# Patient Record
Sex: FEMALE
[De-identification: names, ages, dates, MRNs, and addresses within clinical notes are randomized per-mention and may not be internally consistent; named-entity substitution may affect disease eponyms.]

---

## 2017-04-24 ENCOUNTER — APPOINTMENT (OUTPATIENT)
Dept: CARDIOLOGY | Facility: CLINIC | Age: 72
End: 2017-04-24

## 2017-05-04 ENCOUNTER — APPOINTMENT (OUTPATIENT)
Dept: ORTHOPEDIC SURGERY | Facility: CLINIC | Age: 72
End: 2017-05-04

## 2017-05-04 DIAGNOSIS — M54.32 SCIATICA, LEFT SIDE: ICD-10-CM

## 2017-05-04 DIAGNOSIS — M54.5 LOW BACK PAIN: ICD-10-CM

## 2017-05-08 ENCOUNTER — APPOINTMENT (OUTPATIENT)
Dept: PHYSICAL MEDICINE AND REHAB | Facility: CLINIC | Age: 72
End: 2017-05-08

## 2017-05-08 DIAGNOSIS — M54.16 RADICULOPATHY, LUMBAR REGION: ICD-10-CM

## 2017-05-09 VITALS — RESPIRATION RATE: 16 BRPM | HEART RATE: 70 BPM | HEIGHT: 63 IN | WEIGHT: 143 LBS | BODY MASS INDEX: 25.34 KG/M2

## 2017-05-11 ENCOUNTER — APPOINTMENT (OUTPATIENT)
Dept: CARDIOLOGY | Facility: CLINIC | Age: 72
End: 2017-05-11

## 2017-05-11 VITALS — DIASTOLIC BLOOD PRESSURE: 81 MMHG | OXYGEN SATURATION: 95 % | HEART RATE: 69 BPM | SYSTOLIC BLOOD PRESSURE: 138 MMHG

## 2017-05-18 ENCOUNTER — OUTPATIENT (OUTPATIENT)
Dept: OUTPATIENT SERVICES | Facility: HOSPITAL | Age: 72
LOS: 1 days | End: 2017-05-18
Payer: MEDICARE

## 2017-05-18 DIAGNOSIS — M54.16 RADICULOPATHY, LUMBAR REGION: ICD-10-CM

## 2017-05-28 ENCOUNTER — NON-APPOINTMENT (OUTPATIENT)
Age: 72
End: 2017-05-28

## 2017-07-19 PROCEDURE — 97163 PT EVAL HIGH COMPLEX 45 MIN: CPT

## 2017-07-19 PROCEDURE — 97110 THERAPEUTIC EXERCISES: CPT

## 2017-07-19 PROCEDURE — G8979: CPT | Mod: CH

## 2017-07-19 PROCEDURE — 97010 HOT OR COLD PACKS THERAPY: CPT

## 2017-07-19 PROCEDURE — G8978: CPT | Mod: CJ

## 2017-07-19 PROCEDURE — 97140 MANUAL THERAPY 1/> REGIONS: CPT

## 2017-08-08 ENCOUNTER — APPOINTMENT (OUTPATIENT)
Dept: OBGYN | Facility: CLINIC | Age: 72
End: 2017-08-08
Payer: MEDICARE

## 2017-08-08 VITALS
HEIGHT: 63 IN | BODY MASS INDEX: 25.69 KG/M2 | DIASTOLIC BLOOD PRESSURE: 83 MMHG | HEART RATE: 71 BPM | SYSTOLIC BLOOD PRESSURE: 131 MMHG | WEIGHT: 145 LBS

## 2017-08-08 DIAGNOSIS — N39.0 URINARY TRACT INFECTION, SITE NOT SPECIFIED: ICD-10-CM

## 2017-08-08 LAB
BILIRUB UR QL STRIP: NORMAL
CLARITY UR: NORMAL
COLLECTION METHOD: NORMAL
GLUCOSE UR-MCNC: NORMAL
HCG UR QL: 0.2 EU/DL
HGB UR QL STRIP.AUTO: NORMAL
KETONES UR-MCNC: NORMAL
LEUKOCYTE ESTERASE UR QL STRIP: NORMAL
NITRITE UR QL STRIP: NORMAL
PH UR STRIP: 5
PROT UR STRIP-MCNC: NORMAL
SP GR UR STRIP: 1.02

## 2017-08-08 PROCEDURE — 99213 OFFICE O/P EST LOW 20 MIN: CPT

## 2017-08-08 PROCEDURE — 81003 URINALYSIS AUTO W/O SCOPE: CPT | Mod: QW

## 2017-08-11 LAB — BACTERIA UR CULT: ABNORMAL

## 2017-10-10 ENCOUNTER — APPOINTMENT (OUTPATIENT)
Dept: OBGYN | Facility: CLINIC | Age: 72
End: 2017-10-10
Payer: MEDICARE

## 2017-10-10 VITALS
WEIGHT: 145 LBS | SYSTOLIC BLOOD PRESSURE: 135 MMHG | HEART RATE: 69 BPM | DIASTOLIC BLOOD PRESSURE: 74 MMHG | HEIGHT: 63 IN | BODY MASS INDEX: 25.69 KG/M2

## 2017-10-10 DIAGNOSIS — M85.80 OTHER SPECIFIED DISORDERS OF BONE DENSITY AND STRUCTURE, UNSPECIFIED SITE: ICD-10-CM

## 2017-10-10 PROCEDURE — 99213 OFFICE O/P EST LOW 20 MIN: CPT

## 2017-10-10 PROCEDURE — 82270 OCCULT BLOOD FECES: CPT

## 2017-11-13 ENCOUNTER — OTHER (OUTPATIENT)
Age: 72
End: 2017-11-13

## 2018-03-30 ENCOUNTER — APPOINTMENT (OUTPATIENT)
Dept: PHYSICAL MEDICINE AND REHAB | Facility: CLINIC | Age: 73
End: 2018-03-30

## 2018-05-14 ENCOUNTER — APPOINTMENT (OUTPATIENT)
Dept: CARDIOLOGY | Facility: CLINIC | Age: 73
End: 2018-05-14
Payer: MEDICARE

## 2018-05-14 ENCOUNTER — NON-APPOINTMENT (OUTPATIENT)
Age: 73
End: 2018-05-14

## 2018-05-14 VITALS
DIASTOLIC BLOOD PRESSURE: 79 MMHG | HEART RATE: 67 BPM | WEIGHT: 149 LBS | BODY MASS INDEX: 26.39 KG/M2 | OXYGEN SATURATION: 98 % | SYSTOLIC BLOOD PRESSURE: 144 MMHG

## 2018-05-14 DIAGNOSIS — R06.09 OTHER FORMS OF DYSPNEA: ICD-10-CM

## 2018-05-14 PROCEDURE — 99214 OFFICE O/P EST MOD 30 MIN: CPT

## 2018-05-14 PROCEDURE — 93000 ELECTROCARDIOGRAM COMPLETE: CPT

## 2018-05-14 RX ORDER — IBUPROFEN 600 MG/1
600 TABLET, FILM COATED ORAL TWICE DAILY
Qty: 60 | Refills: 0 | Status: DISCONTINUED | COMMUNITY
Start: 2017-05-04 | End: 2018-05-14

## 2018-05-14 RX ORDER — METHYLPREDNISOLONE 4 MG/1
4 TABLET ORAL
Qty: 21 | Refills: 2 | Status: DISCONTINUED | COMMUNITY
Start: 2017-05-04 | End: 2018-05-14

## 2018-05-14 RX ORDER — NITROFURANTOIN MACROCRYSTALS 100 MG/1
100 CAPSULE ORAL
Qty: 10 | Refills: 0 | Status: DISCONTINUED | COMMUNITY
Start: 2017-08-08 | End: 2018-05-14

## 2018-06-25 ENCOUNTER — APPOINTMENT (OUTPATIENT)
Dept: OBGYN | Facility: CLINIC | Age: 73
End: 2018-06-25
Payer: MEDICARE

## 2018-06-25 VITALS
WEIGHT: 145 LBS | SYSTOLIC BLOOD PRESSURE: 140 MMHG | BODY MASS INDEX: 25.69 KG/M2 | HEIGHT: 63 IN | HEART RATE: 81 BPM | DIASTOLIC BLOOD PRESSURE: 77 MMHG

## 2018-06-25 LAB
BILIRUB UR QL STRIP: NEGATIVE
GLUCOSE UR-MCNC: NEGATIVE
HCG UR QL: 0.2 EU/DL
HGB UR QL STRIP.AUTO: NORMAL
KETONES UR-MCNC: NEGATIVE
LEUKOCYTE ESTERASE UR QL STRIP: NORMAL
NITRITE UR QL STRIP: NEGATIVE
PH UR STRIP: 5.5
PROT UR STRIP-MCNC: NEGATIVE
SP GR UR STRIP: <=1.005

## 2018-06-25 PROCEDURE — 81003 URINALYSIS AUTO W/O SCOPE: CPT | Mod: QW

## 2018-06-25 PROCEDURE — 99213 OFFICE O/P EST LOW 20 MIN: CPT

## 2018-06-29 LAB — BACTERIA UR CULT: ABNORMAL

## 2018-11-26 ENCOUNTER — APPOINTMENT (OUTPATIENT)
Dept: OBGYN | Facility: CLINIC | Age: 73
End: 2018-11-26
Payer: MEDICARE

## 2018-11-26 VITALS
SYSTOLIC BLOOD PRESSURE: 144 MMHG | WEIGHT: 146 LBS | HEIGHT: 63 IN | DIASTOLIC BLOOD PRESSURE: 80 MMHG | BODY MASS INDEX: 25.87 KG/M2

## 2018-11-26 DIAGNOSIS — Z00.00 ENCOUNTER FOR GENERAL ADULT MEDICAL EXAMINATION W/OUT ABNORMAL FINDINGS: ICD-10-CM

## 2018-11-26 PROCEDURE — 82270 OCCULT BLOOD FECES: CPT

## 2018-11-26 PROCEDURE — G0101: CPT

## 2018-11-30 LAB — CYTOLOGY CVX/VAG DOC THIN PREP: NORMAL

## 2019-01-30 ENCOUNTER — APPOINTMENT (OUTPATIENT)
Dept: VASCULAR SURGERY | Facility: CLINIC | Age: 74
End: 2019-01-30
Payer: MEDICARE

## 2019-01-30 VITALS
HEART RATE: 72 BPM | SYSTOLIC BLOOD PRESSURE: 119 MMHG | BODY MASS INDEX: 25.84 KG/M2 | HEIGHT: 62.5 IN | TEMPERATURE: 98.6 F | WEIGHT: 144 LBS | DIASTOLIC BLOOD PRESSURE: 71 MMHG | RESPIRATION RATE: 16 BRPM | OXYGEN SATURATION: 97 %

## 2019-01-30 PROCEDURE — 99203 OFFICE O/P NEW LOW 30 MIN: CPT

## 2019-01-30 RX ORDER — SULFAMETHOXAZOLE AND TRIMETHOPRIM 800; 160 MG/1; MG/1
800-160 TABLET ORAL TWICE DAILY
Qty: 14 | Refills: 1 | Status: DISCONTINUED | COMMUNITY
Start: 2018-06-25 | End: 2019-01-30

## 2019-02-27 ENCOUNTER — APPOINTMENT (OUTPATIENT)
Dept: ORTHOPEDIC SURGERY | Facility: CLINIC | Age: 74
End: 2019-02-27

## 2019-03-18 NOTE — PHYSICAL EXAM
[Normal Breath Sounds] : Normal breath sounds [Normal Heart Sounds] : normal heart sounds [2+] : left 2+ [No Rash or Lesion] : No rash or lesion [Alert] : alert [Oriented to Person] : oriented to person [Oriented to Place] : oriented to place [Oriented to Time] : oriented to time [Calm] : calm [Ankle Swelling (On Exam)] : not present [JVD] : no jugular venous distention  [Varicose Veins Of Lower Extremities] : not present [] : not present [Abdomen Masses] : No abdominal masses [Tender] : was nontender [Enlarged] : not enlarged [de-identified] : AOX3 in NAD  [de-identified] : Normocephalic, atraumatic  [de-identified] : OPAL not indicated  [de-identified] : Soft, NT, ND

## 2019-03-18 NOTE — HISTORY OF PRESENT ILLNESS
[FreeTextEntry1] : 73 year old post menopausal female seen in vascular office today after having been referred by Dr. Lombardi given imaging findings of a calcified 1cm splenic artery aneurysm Dec 2018. Patient states that she was getting a routine physical exam when a CXR was ordered at the time which prompted the subsequent CT scan (unable to articulate why she needed the CXR in the first place) States that she is in her usual state of health and has not noticed any recent health changes. ROS negative for resting leg pain, fever, chills, nausea, vomiting, chest pain, SOB, dyspnea, abd pain, dysuria or changes in bowel function.

## 2019-03-18 NOTE — ASSESSMENT
[FreeTextEntry1] : 73 year old female with asymptomatic splenic artery aneurysm seen on imaging. No acute surgical intervention at this time. Recommend return to vascular office in 6 months for ultrasound to assess size and if unchanged, return for follow up PRN. \par \par -Continue meds\par -Activity as tolerated\par -RTC in 6 months [Arterial/Venous Disease] : arterial/venous disease

## 2019-05-16 ENCOUNTER — APPOINTMENT (OUTPATIENT)
Dept: CARDIOLOGY | Facility: CLINIC | Age: 74
End: 2019-05-16
Payer: MEDICARE

## 2019-05-16 ENCOUNTER — NON-APPOINTMENT (OUTPATIENT)
Age: 74
End: 2019-05-16

## 2019-05-16 VITALS
DIASTOLIC BLOOD PRESSURE: 79 MMHG | RESPIRATION RATE: 16 BRPM | HEART RATE: 74 BPM | BODY MASS INDEX: 26.1 KG/M2 | SYSTOLIC BLOOD PRESSURE: 137 MMHG | OXYGEN SATURATION: 96 % | WEIGHT: 145 LBS

## 2019-05-16 DIAGNOSIS — R09.89 OTHER SPECIFIED SYMPTOMS AND SIGNS INVOLVING THE CIRCULATORY AND RESPIRATORY SYSTEMS: ICD-10-CM

## 2019-05-16 PROCEDURE — 93306 TTE W/DOPPLER COMPLETE: CPT

## 2019-05-16 PROCEDURE — 93880 EXTRACRANIAL BILAT STUDY: CPT

## 2019-05-16 PROCEDURE — 93000 ELECTROCARDIOGRAM COMPLETE: CPT

## 2019-05-16 PROCEDURE — 99214 OFFICE O/P EST MOD 30 MIN: CPT

## 2019-05-16 RX ORDER — LOSARTAN POTASSIUM 50 MG/1
50 TABLET, FILM COATED ORAL DAILY
Qty: 90 | Refills: 0 | Status: DISCONTINUED | COMMUNITY
Start: 2019-05-02 | End: 2019-05-16

## 2019-05-16 RX ORDER — OMEPRAZOLE 20 MG/1
20 CAPSULE, DELAYED RELEASE ORAL
Qty: 30 | Refills: 0 | Status: DISCONTINUED | COMMUNITY
Start: 2017-05-01 | End: 2019-05-16

## 2019-05-28 ENCOUNTER — APPOINTMENT (OUTPATIENT)
Dept: ORTHOPEDIC SURGERY | Facility: CLINIC | Age: 74
End: 2019-05-28
Payer: MEDICARE

## 2019-05-28 VITALS
HEART RATE: 65 BPM | HEIGHT: 63 IN | BODY MASS INDEX: 25.69 KG/M2 | WEIGHT: 145 LBS | SYSTOLIC BLOOD PRESSURE: 143 MMHG | DIASTOLIC BLOOD PRESSURE: 76 MMHG

## 2019-05-28 DIAGNOSIS — M47.816 SPONDYLOSIS W/OUT MYELOPATHY OR RADICULOPATHY, LUMBAR REGION: ICD-10-CM

## 2019-05-28 DIAGNOSIS — M41.50 OTHER SECONDARY SCOLIOSIS, SITE UNSPECIFIED: ICD-10-CM

## 2019-05-28 PROCEDURE — 96372 THER/PROPH/DIAG INJ SC/IM: CPT

## 2019-05-28 PROCEDURE — 99214 OFFICE O/P EST MOD 30 MIN: CPT | Mod: 25

## 2019-05-28 PROCEDURE — 72100 X-RAY EXAM L-S SPINE 2/3 VWS: CPT

## 2019-05-28 NOTE — REVIEW OF SYSTEMS
[Joint Pain] : joint pain [Negative] : Heme/Lymph [Chills] : no chills [Fever] : no fever [Cough] : no cough [SOB on Exertion] : no shortness of breath on exertion

## 2019-05-28 NOTE — PHYSICAL EXAM
[de-identified] : Xray of the lumbar spine taken today shows lumbar spondylosis as well as degenerative rotoscoliosis, demonstrates retrolisthesis about L3 in relation to L4, progression of rotoscoliosis when compared to prior xrays from 2017\par \par  [de-identified] : CONSTITUTIONAL: The patient is a very pleasant individual who is well-nourished and who appears stated age.\par PSYCHIATRIC: The patient is alert and oriented X 3 and in no apparent distress, and participates with orthopedic evaluation well.\par HEAD: Atraumatic and is nonsyndromic in appearance.\par EENT: No visible thyromegaly, EOMI.\par RESPIRATORY: Respiratory rate is regular, not dyspneic on examination.\par LYMPHATICS: There is no inguinal lymphadenopathy\par INTEGUMENTARY: Skin is clean, dry, and intact about the bilateral lower extremities and lumbar spine.\par VASCULAR: There is brisk capillary refill about the bilateral lower extremities.\par NEUROLOGIC: There are no pathologic reflexes. There is no decrease in sensation of the bilateral lower extremities on Wartenberg pinwheel examination. Deep tendon reflexes are well maintained at 2+/4 of the bilateral lower extremities and are symmetric..\par MUSCULOSKELETAL: There is no visible muscular atrophy. Manual motor strength is well maintained in the bilateral lower extremities. Range of motion of lumbar spine is well maintained. The patient ambulates in a non- myelopathic manner. Mildly positive tension sign and straight leg raise on the left Quad extension, ankle dorsiflexion, EHL, plantar flexion, and ankle eversion are well preserved. Normal secondary orthopaedic exam of bilateral hips, greater trochanteric area, knees and ankles. \par \par Mechanically oriented low back pain.

## 2019-05-28 NOTE — HISTORY OF PRESENT ILLNESS
[de-identified] : 73 year old female presents for lumbar spine pain, last seen 2 years ago. Her pain is L>R. She has done PT which improved her pain, but has since stopped. 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 She has no recent conservative treatment. Patient denies taking any medications for pain. Her pain radiates to left lateral hip and down her lateral thigh.  [Ataxia] : no ataxia [Incontinence] : no incontinence [Loss of Dexterity] : good dexterity [Urinary Ret.] : no urinary retention

## 2019-05-28 NOTE — ADDENDUM
[FreeTextEntry1] : Documented by Marcelina Bar acting as a scribe for PERRI Price. on 05/28/2019. All medical record entries made by the Scribe were at my, PERRI Price, direction and personally dictated by me on 05/28/2019. I have reviewed the chart and agree that the record accurately reflects my personal performance of the history, physical exam, assessment and plan. I have also personally directed, reviewed, and agreed with the chart.

## 2019-05-28 NOTE — DISCUSSION/SUMMARY
[de-identified] : She is going to restart PT for 1-2x per week for 4 weeks. She may continue with home exercise, or her pain will return. We will consider MRI if she continues to be symptomatic despite home exercise and PT. We have provided a prescription for Naprosyn 500 BID or prn for pain. She was given a therapeutic injection today for symptomatic relief. She may follow up prn.

## 2019-05-28 NOTE — PROCEDURE
[de-identified] : Verbal consent was obtained and all questions, comments and concerns were addressed. Right buttock was cleansed with alcohol prep X 3, ethyl chloride was used as local anesthetic. Under sterile precautions 30mg of Ketorolac and 80mg Depo-Medrol were instilled in to the right buttock under sterile precautions. Patient tolerated procedure well. Dry sterile dressing was placed and patient described relief of pain 5 minutes after procedure was performed.

## 2019-05-31 RX ORDER — MULTIVIT-MIN/FOLIC/VIT K/LYCOP 400-300MCG
10 MCG TABLET ORAL
Qty: 60 | Refills: 0 | Status: ACTIVE | COMMUNITY
Start: 2018-12-18

## 2019-06-21 ENCOUNTER — OTHER (OUTPATIENT)
Age: 74
End: 2019-06-21

## 2019-06-25 ENCOUNTER — APPOINTMENT (OUTPATIENT)
Dept: ORTHOPEDIC SURGERY | Facility: CLINIC | Age: 74
End: 2019-06-25

## 2019-07-16 ENCOUNTER — APPOINTMENT (OUTPATIENT)
Dept: VASCULAR SURGERY | Facility: CLINIC | Age: 74
End: 2019-07-16

## 2019-07-23 ENCOUNTER — APPOINTMENT (OUTPATIENT)
Dept: VASCULAR SURGERY | Facility: CLINIC | Age: 74
End: 2019-07-23
Payer: MEDICARE

## 2019-07-23 VITALS
DIASTOLIC BLOOD PRESSURE: 77 MMHG | HEART RATE: 66 BPM | BODY MASS INDEX: 26.05 KG/M2 | TEMPERATURE: 98 F | SYSTOLIC BLOOD PRESSURE: 161 MMHG | WEIGHT: 147 LBS | HEIGHT: 63 IN | OXYGEN SATURATION: 95 %

## 2019-07-23 PROCEDURE — 99213 OFFICE O/P EST LOW 20 MIN: CPT

## 2019-07-23 PROCEDURE — 93978 VASCULAR STUDY: CPT

## 2020-12-15 PROBLEM — N39.0 ACUTE LOWER UTI: Status: RESOLVED | Noted: 2017-08-08 | Resolved: 2020-12-15

## 2021-01-16 ENCOUNTER — NON-APPOINTMENT (OUTPATIENT)
Age: 76
End: 2021-01-16

## 2021-05-04 ENCOUNTER — APPOINTMENT (OUTPATIENT)
Dept: GASTROENTEROLOGY | Facility: CLINIC | Age: 76
End: 2021-05-04
Payer: MEDICARE

## 2021-05-04 VITALS
HEART RATE: 73 BPM | HEIGHT: 63 IN | DIASTOLIC BLOOD PRESSURE: 90 MMHG | RESPIRATION RATE: 14 BRPM | SYSTOLIC BLOOD PRESSURE: 154 MMHG | WEIGHT: 140 LBS | BODY MASS INDEX: 24.8 KG/M2 | OXYGEN SATURATION: 98 % | TEMPERATURE: 98 F

## 2021-05-04 PROCEDURE — 82272 OCCULT BLD FECES 1-3 TESTS: CPT

## 2021-05-04 PROCEDURE — 99204 OFFICE O/P NEW MOD 45 MIN: CPT

## 2021-05-04 NOTE — HISTORY OF PRESENT ILLNESS
[de-identified] : Patient over the last 6 months is seen a significant change in bowel habits.  Her stools are scybalous in nature and she has incomplete evacuation and sometimes more frequent bowel movements.  She has no rectal bleeding abdominal pain or weight loss or loss of appetite.  Patient does not remember when her last colonoscopy was but had to be at least 10 years ago.  She said 1 colonoscopy could not be completed and she went for an x-ray afterwards which sounds like a virtual colonoscopy but she was not clear.  She also thought she might of had 1 done after that.  The patient does have a history of IBS years ago.

## 2021-05-04 NOTE — ASSESSMENT
[FreeTextEntry1] : I discussed with the patient that given her history of recent onset of altered bowel habits and its been many years since her last colonoscopy it would be appropriate to do a colonoscopy to evaluate the etiology of these symptoms.  This could just be her IBS or possibly could represent a more significant problem such as a colon neoplasm.  However, given her prior history of difficulty with colonoscopies I told the patient we would attempt to get the records from [facility] to see what the issue was as well as to see if we could find out if she did have a virtual CT or other evaluation of her colon by the radiologist.  She will also get routine blood work performed.  Pending review of this prior history we will determine what our next step is and most likely she will need to have a colonoscopy

## 2021-05-14 ENCOUNTER — NON-APPOINTMENT (OUTPATIENT)
Age: 76
End: 2021-05-14

## 2021-05-14 LAB
ALBUMIN SERPL ELPH-MCNC: 4.7 G/DL
ALP BLD-CCNC: 81 U/L
ALT SERPL-CCNC: 14 U/L
ANION GAP SERPL CALC-SCNC: 13 MMOL/L
AST SERPL-CCNC: 39 U/L
BASOPHILS # BLD AUTO: 0.03 K/UL
BASOPHILS NFR BLD AUTO: 0.6 %
BILIRUB SERPL-MCNC: 0.6 MG/DL
BUN SERPL-MCNC: 19 MG/DL
CALCIUM SERPL-MCNC: 9.5 MG/DL
CHLORIDE SERPL-SCNC: 106 MMOL/L
CO2 SERPL-SCNC: 24 MMOL/L
CREAT SERPL-MCNC: 0.61 MG/DL
EOSINOPHIL # BLD AUTO: 0.13 K/UL
EOSINOPHIL NFR BLD AUTO: 2.7 %
GLUCOSE SERPL-MCNC: 92 MG/DL
HCT VFR BLD CALC: 44.5 %
HGB BLD-MCNC: 14.3 G/DL
IMM GRANULOCYTES NFR BLD AUTO: 0.2 %
LYMPHOCYTES # BLD AUTO: 1.83 K/UL
LYMPHOCYTES NFR BLD AUTO: 38 %
MAN DIFF?: NORMAL
MCHC RBC-ENTMCNC: 30.6 PG
MCHC RBC-ENTMCNC: 32.1 GM/DL
MCV RBC AUTO: 95.3 FL
MONOCYTES # BLD AUTO: 0.48 K/UL
MONOCYTES NFR BLD AUTO: 10 %
NEUTROPHILS # BLD AUTO: 2.34 K/UL
NEUTROPHILS NFR BLD AUTO: 48.5 %
PLATELET # BLD AUTO: 267 K/UL
POTASSIUM SERPL-SCNC: 4.4 MMOL/L
PROT SERPL-MCNC: 6.8 G/DL
RBC # BLD: 4.67 M/UL
RBC # FLD: 13.7 %
SODIUM SERPL-SCNC: 142 MMOL/L
WBC # FLD AUTO: 4.82 K/UL

## 2021-06-18 ENCOUNTER — NON-APPOINTMENT (OUTPATIENT)
Age: 76
End: 2021-06-18

## 2021-06-18 ENCOUNTER — APPOINTMENT (OUTPATIENT)
Dept: CARDIOLOGY | Facility: CLINIC | Age: 76
End: 2021-06-18
Payer: MEDICARE

## 2021-06-18 VITALS
HEART RATE: 70 BPM | SYSTOLIC BLOOD PRESSURE: 140 MMHG | TEMPERATURE: 99.1 F | DIASTOLIC BLOOD PRESSURE: 66 MMHG | OXYGEN SATURATION: 95 % | WEIGHT: 140 LBS | HEIGHT: 60 IN | BODY MASS INDEX: 27.48 KG/M2

## 2021-06-18 VITALS — SYSTOLIC BLOOD PRESSURE: 126 MMHG | DIASTOLIC BLOOD PRESSURE: 66 MMHG

## 2021-06-18 DIAGNOSIS — I72.8 ANEURYSM OF OTHER SPECIFIED ARTERIES: ICD-10-CM

## 2021-06-18 PROCEDURE — 99213 OFFICE O/P EST LOW 20 MIN: CPT

## 2021-06-18 PROCEDURE — 93000 ELECTROCARDIOGRAM COMPLETE: CPT

## 2021-06-18 RX ORDER — NAPROXEN 500 MG/1
500 TABLET ORAL
Qty: 60 | Refills: 0 | Status: DISCONTINUED | COMMUNITY
Start: 2019-05-30 | End: 2021-06-18

## 2021-06-18 NOTE — REVIEW OF SYSTEMS
[Feeling Fatigued] : feeling fatigued [Dyspnea on exertion] : dyspnea during exertion [Negative] : Heme/Lymph [Fever] : no fever [Chills] : no chills [Leg Claudication] : no intermittent leg claudication [Palpitations] : no palpitations

## 2021-06-18 NOTE — PHYSICAL EXAM
[Well Developed] : well developed [Well Nourished] : well nourished [No Acute Distress] : no acute distress [Normal Venous Pressure] : normal venous pressure [Normal S1, S2] : normal S1, S2 [No Murmur] : no murmur [No Rub] : no rub [No Gallop] : no gallop [Clear Lung Fields] : clear lung fields [Good Air Entry] : good air entry [No Respiratory Distress] : no respiratory distress  [Soft] : abdomen soft [Non Tender] : non-tender [No Masses/organomegaly] : no masses/organomegaly [Normal Bowel Sounds] : normal bowel sounds [Normal Gait] : normal gait [No Edema] : no edema [No Cyanosis] : no cyanosis [No Clubbing] : no clubbing [No Varicosities] : no varicosities [No Rash] : no rash [No Skin Lesions] : no skin lesions [Moves all extremities] : moves all extremities [No Focal Deficits] : no focal deficits [Normal Speech] : normal speech [Alert and Oriented] : alert and oriented [Normal memory] : normal memory [de-identified] : Right carotid bruit

## 2021-06-18 NOTE — ASSESSMENT
[FreeTextEntry1] : Pt was not taken her BP meds\par BP to goal at this time\par Low salt diet, ont with losartan\par Labs from pmd requested.\par No significant murmur on exam\par Patient was advised to partake in 150 minutes of moderate exercise per week.\par Last stress test done in 2016. \par TTE and carotid results dw pt as well\par Patient was advised to see us in 1 year if no new symptoms or earlier with any change in symptoms.\par Pt with splenic artery aneurysm, followed by Dr Mcgee. \par

## 2021-06-18 NOTE — HISTORY OF PRESENT ILLNESS
[FreeTextEntry1] : Very pleasant 75-year-old female who is here with hypertension hyperlipidemia and valvular heart disease.  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  She also tries to walk.  She denies having any chest pain.  Her breathing is nonlabored.  She denies any palpitations syncope or presyncope.  She has no side effects with simvastatin.  She has seen\par \par Primary care physician recently had blood work done\par \par EKG today with normal sinus rhythm normal axis and intervals and no new ST-T changes.  Leftward axis is present.\par \par \par

## 2021-07-08 ENCOUNTER — RX RENEWAL (OUTPATIENT)
Age: 76
End: 2021-07-08

## 2021-07-30 ENCOUNTER — APPOINTMENT (OUTPATIENT)
Dept: ORTHOPEDIC SURGERY | Facility: CLINIC | Age: 76
End: 2021-07-30

## 2021-08-27 ENCOUNTER — APPOINTMENT (OUTPATIENT)
Dept: ORTHOPEDIC SURGERY | Facility: CLINIC | Age: 76
End: 2021-08-27
Payer: MEDICARE

## 2021-08-27 VITALS
HEIGHT: 64 IN | DIASTOLIC BLOOD PRESSURE: 79 MMHG | WEIGHT: 133.8 LBS | BODY MASS INDEX: 22.84 KG/M2 | HEART RATE: 63 BPM | SYSTOLIC BLOOD PRESSURE: 132 MMHG

## 2021-08-27 PROCEDURE — 99214 OFFICE O/P EST MOD 30 MIN: CPT | Mod: 25

## 2021-08-27 PROCEDURE — 20610 DRAIN/INJ JOINT/BURSA W/O US: CPT | Mod: 50

## 2021-08-27 PROCEDURE — 73562 X-RAY EXAM OF KNEE 3: CPT | Mod: 50

## 2021-08-27 RX ORDER — OXICONAZOLE NITRATE 10 MG/G
1 CREAM TOPICAL
Qty: 60 | Refills: 0 | Status: ACTIVE | COMMUNITY
Start: 2021-05-11

## 2022-01-06 ENCOUNTER — APPOINTMENT (OUTPATIENT)
Dept: GASTROENTEROLOGY | Facility: CLINIC | Age: 77
End: 2022-01-06
Payer: MEDICARE

## 2022-01-06 VITALS
HEART RATE: 60 BPM | TEMPERATURE: 98 F | HEIGHT: 64 IN | RESPIRATION RATE: 16 BRPM | OXYGEN SATURATION: 97 % | WEIGHT: 133 LBS | SYSTOLIC BLOOD PRESSURE: 110 MMHG | DIASTOLIC BLOOD PRESSURE: 70 MMHG | BODY MASS INDEX: 22.71 KG/M2

## 2022-01-06 DIAGNOSIS — R19.4 CHANGE IN BOWEL HABIT: ICD-10-CM

## 2022-01-06 PROCEDURE — 99214 OFFICE O/P EST MOD 30 MIN: CPT

## 2022-01-06 NOTE — ASSESSMENT
[FreeTextEntry1] : I discussed with the patient that it be worthwhile to do a colonoscopy at least attempted even if it cannot be performed 10 years ago.  She has had a change in bowel habits and a positive Cologuard test and needs to be investigated further.  If it becomes technically impossible to complete the colonoscopy then we will send her for a virtual anoscopy.  She will get repeat blood work including CBC and CMP and Clenpiq was ordered.  The indications benefits risks alternatives to colonoscopy discussed with the patient and she is medically optimal for the planned procedure

## 2022-01-06 NOTE — HISTORY OF PRESENT ILLNESS
[de-identified] : Patient with a history of change in bowel habits and constipation and a positive Cologuard test.  She was seen in May and was post to get results of her prior colonoscopy at [facility] over 10 years ago but that was never accomplished.  There was a question of whether or not there was an incomplete colonoscopy and possible virtual colonoscopy done at that time.  Patient now presents for follow-up still having issues with her bowel movements like taking Citrucel.  She has no abdominal pain or rectal bleeding or weight loss.  Recent blood work in May was normal including CBC and CMP.

## 2022-01-06 NOTE — REASON FOR VISIT
[Follow-Up: _____] : a [unfilled] follow-up visit [FreeTextEntry1] : Altered bowel habits, positive Cologuard test

## 2022-01-20 ENCOUNTER — RX RENEWAL (OUTPATIENT)
Age: 77
End: 2022-01-20

## 2022-01-20 RX ORDER — LOSARTAN POTASSIUM 100 MG/1
100 TABLET, FILM COATED ORAL DAILY
Qty: 90 | Refills: 1 | Status: ACTIVE | COMMUNITY
Start: 2019-05-16 | End: 1900-01-01

## 2022-01-28 LAB
ALBUMIN SERPL ELPH-MCNC: 4.6 G/DL
ALP BLD-CCNC: 86 U/L
ALT SERPL-CCNC: 11 U/L
ANION GAP SERPL CALC-SCNC: 11 MMOL/L
AST SERPL-CCNC: 36 U/L
BASOPHILS # BLD AUTO: 0.03 K/UL
BASOPHILS NFR BLD AUTO: 0.6 %
BILIRUB SERPL-MCNC: 0.3 MG/DL
BUN SERPL-MCNC: 23 MG/DL
CALCIUM SERPL-MCNC: 9.2 MG/DL
CHLORIDE SERPL-SCNC: 108 MMOL/L
CO2 SERPL-SCNC: 23 MMOL/L
CREAT SERPL-MCNC: 0.56 MG/DL
EOSINOPHIL # BLD AUTO: 0.18 K/UL
EOSINOPHIL NFR BLD AUTO: 3.5 %
GLUCOSE SERPL-MCNC: 110 MG/DL
HCT VFR BLD CALC: 41.9 %
HGB BLD-MCNC: 13.8 G/DL
IMM GRANULOCYTES NFR BLD AUTO: 0.2 %
LYMPHOCYTES # BLD AUTO: 1.89 K/UL
LYMPHOCYTES NFR BLD AUTO: 36.4 %
MAN DIFF?: NORMAL
MCHC RBC-ENTMCNC: 31 PG
MCHC RBC-ENTMCNC: 32.9 GM/DL
MCV RBC AUTO: 94.2 FL
MONOCYTES # BLD AUTO: 0.47 K/UL
MONOCYTES NFR BLD AUTO: 9.1 %
NEUTROPHILS # BLD AUTO: 2.61 K/UL
NEUTROPHILS NFR BLD AUTO: 50.2 %
PLATELET # BLD AUTO: 265 K/UL
POTASSIUM SERPL-SCNC: 4.2 MMOL/L
PROT SERPL-MCNC: 6.5 G/DL
RBC # BLD: 4.45 M/UL
RBC # FLD: 13.2 %
SODIUM SERPL-SCNC: 142 MMOL/L
WBC # FLD AUTO: 5.19 K/UL

## 2022-02-22 ENCOUNTER — APPOINTMENT (OUTPATIENT)
Dept: GASTROENTEROLOGY | Facility: GI CENTER | Age: 77
End: 2022-02-22

## 2022-03-24 ENCOUNTER — APPOINTMENT (OUTPATIENT)
Dept: OBGYN | Facility: CLINIC | Age: 77
End: 2022-03-24
Payer: MEDICARE

## 2022-03-24 VITALS
HEIGHT: 64 IN | WEIGHT: 137 LBS | DIASTOLIC BLOOD PRESSURE: 80 MMHG | BODY MASS INDEX: 23.39 KG/M2 | SYSTOLIC BLOOD PRESSURE: 118 MMHG

## 2022-03-24 DIAGNOSIS — Z01.419 ENCOUNTER FOR GYNECOLOGICAL EXAMINATION (GENERAL) (ROUTINE) W/OUT ABNORMAL FINDINGS: ICD-10-CM

## 2022-03-24 DIAGNOSIS — R10.2 PELVIC AND PERINEAL PAIN: ICD-10-CM

## 2022-03-24 DIAGNOSIS — N81.4 UTEROVAGINAL PROLAPSE, UNSPECIFIED: ICD-10-CM

## 2022-03-24 PROCEDURE — 99203 OFFICE O/P NEW LOW 30 MIN: CPT | Mod: 25

## 2022-03-24 PROCEDURE — G0101: CPT

## 2022-03-24 NOTE — PLAN
[FreeTextEntry1] : \par Clinical breast exam performed and self breast exam taught with understanding.  Patient had a recent mammogram which resulted within normal limits, through another provider.\par Rx for DEXA scan submitted\par Patient no longer requires annual Pap smear examinations as per ASCCP guidelines\par Subjective history of physical examination consistent with a cystocele with apical prolapse and patient was given Risks, benefits, and alternatives and options for treatment.  She was handed referral to urogynecology.\par All questions addressed\par She may return to office in 1 years time, or as needed\par

## 2022-03-24 NOTE — REASON FOR VISIT
[Annual] : an annual visit. [FreeTextEntry2] : Annual well woman appointment and additional concern regarding pelvic pressure

## 2022-03-24 NOTE — HISTORY OF PRESENT ILLNESS
[FreeTextEntry1] : 76-year-old female  presenting office for her annual well woman appointment and additional concern for pelvic pressure.  Patient feels as though there is a giant ball in her vaginal area with incidents of Valsalva pressure, such as gardening.\par \par Obstetric history of 2 prior vaginal deliveries without complication.  Denies gynecologic history of uterine fibroids, ovarian cyst, STIs, or abnormal Pap smears.  Medical history of increased blood pressure and cholesterol for which she takes medications and is followed by a primary care physician.  Surgical history of umbilical hernia repair.  Denies family history of gynecologic, breast, colon cancer.  She had a recent mammogram which resulted within normal limits.  She has not had a DEXA scan in several years.  Denies alcohol, tobacco, illicit drug use.  Denies allergies to medications.

## 2022-03-24 NOTE — PHYSICAL EXAM
[Appropriately responsive] : appropriately responsive [Alert] : alert [No Acute Distress] : no acute distress [Soft] : soft [Non-tender] : non-tender [Non-distended] : non-distended [No HSM] : No HSM [No Lesions] : no lesions [No Mass] : no mass [Oriented x3] : oriented x3 [Examination Of The Breasts] : a normal appearance [No Masses] : no breast masses were palpable [Labia Majora] : normal [Labia Minora] : normal [Cystocele] : a cystocele [Normal] : normal [FreeTextEntry4] : Large cystocele with Valsalva with apical prolapse [FreeTextEntry6] : Apical prolapse with Valsalva

## 2022-05-31 ENCOUNTER — RESULT CHARGE (OUTPATIENT)
Age: 77
End: 2022-05-31

## 2022-05-31 ENCOUNTER — APPOINTMENT (OUTPATIENT)
Age: 77
End: 2022-05-31
Payer: MEDICARE

## 2022-05-31 VITALS — DIASTOLIC BLOOD PRESSURE: 84 MMHG | SYSTOLIC BLOOD PRESSURE: 153 MMHG

## 2022-05-31 DIAGNOSIS — N39.0 URINARY TRACT INFECTION, SITE NOT SPECIFIED: ICD-10-CM

## 2022-05-31 DIAGNOSIS — R15.9 FULL INCONTINENCE OF FECES: ICD-10-CM

## 2022-05-31 DIAGNOSIS — Z72.89 OTHER PROBLEMS RELATED TO LIFESTYLE: ICD-10-CM

## 2022-05-31 DIAGNOSIS — N81.4 UTEROVAGINAL PROLAPSE, UNSPECIFIED: ICD-10-CM

## 2022-05-31 LAB
BILIRUB UR QL STRIP: NORMAL
CLARITY UR: CLEAR
COLLECTION METHOD: NORMAL
GLUCOSE UR-MCNC: NORMAL
HCG UR QL: 0.2 EU/DL
HGB UR QL STRIP.AUTO: NORMAL
KETONES UR-MCNC: NORMAL
LEUKOCYTE ESTERASE UR QL STRIP: NORMAL
NITRITE UR QL STRIP: NORMAL
PH UR STRIP: 5.5
PROT UR STRIP-MCNC: NORMAL
SP GR UR STRIP: 1

## 2022-05-31 PROCEDURE — 81003 URINALYSIS AUTO W/O SCOPE: CPT | Mod: QW

## 2022-05-31 PROCEDURE — 99204 OFFICE O/P NEW MOD 45 MIN: CPT | Mod: 25

## 2022-05-31 PROCEDURE — 51701 INSERT BLADDER CATHETER: CPT

## 2022-05-31 PROCEDURE — 99214 OFFICE O/P EST MOD 30 MIN: CPT | Mod: 25

## 2022-05-31 RX ORDER — SODIUM PICOSULFATE, MAGNESIUM OXIDE, AND ANHYDROUS CITRIC ACID 10; 3.5; 12 MG/160ML; G/160ML; G/160ML
10-3.5-12 MG-GM LIQUID ORAL
Qty: 1 | Refills: 0 | Status: DISCONTINUED | COMMUNITY
Start: 2022-01-06 | End: 2022-05-31

## 2022-05-31 NOTE — HISTORY OF PRESENT ILLNESS
[Cystocele (Obstetric)] : no [Uterine Prolapse] : no [Vaginal Wall Prolapse] : no [Rectal Prolapse] : moderate [Unable To Restrain Bowel Movement] : no [Hematuria] : no [x1] : nocturia once nightly [Feelings Of Urinary Urgency] : no [Pain During Urination (Dysuria)] : no [Urinary Tract Infection] : mild [Constipation Obstructed Defecation] : mild [Stool Visible Blood] : mild [] : no [de-identified] : 4 [FreeTextEntry1] : \par  75 y/o presents with vaginal bulge, she self reduces, she reports it goes back in when she voids, she reports sometimes she has constipation, sometimes she does not, rare leakage of stool, no leakage of urine, no frequency, no urgency, no dysuria, no hematuria, no UTIs, no intermittent stream, no pelvic pain, not sexually active, no desires to be in the future, no VB\par \par has IBS, was scheduled for colonoscopy however did not have it done due to passing out the day of from dehydration \par \par h/o umbilical hernia- no mesh

## 2022-05-31 NOTE — PHYSICAL EXAM
[Chaperone Present] : A chaperone was present in the examining room during all aspects of the physical examination [No Acute Distress] : in no acute distress [Well developed] : well developed [Well Nourished] : ~L well nourished [Oriented x3] : oriented to person, place, and time [Normal Memory] : ~T memory was ~L unimpaired [Normal Mood/Affect] : mood and affect are normal [None] : no CVA tenderness [Warm and Dry] : was warm and dry to touch [Normal Gait] : gait was normal [Vulvar Atrophy] : vulvar atrophy [Labia Majora] : were normal [Labia Minora] : were normal [Atrophy] : atrophy [No Bleeding] : there was no active vaginal bleeding [2] : 2 [Aa ____] : Aa [unfilled] [Ba ____] : Ba [unfilled] [C ____] : C [unfilled] [GH ____] : GH [unfilled] [PB ____] : PB [unfilled] [TVL ____] : TVL  [unfilled] [Ap ____] : Ap [unfilled] [Bp ____] : Bp [unfilled] [D ____] : D [unfilled] [Uterine Adnexae] : were not tender and not enlarged [Normal] : no abnormalities [Post Void Residual ____ml] : post void residual was [unfilled] ml [Cough] : no cough [Tenderness] : ~T no ~M abdominal tenderness observed [Distended] : not distended [Inguinal LAD] : no adenopathy was noted in the inguinal lymph nodes [FreeTextEntry3] : neg CST [de-identified] : difficult to visualize,

## 2022-05-31 NOTE — DISCUSSION/SUMMARY
[FreeTextEntry1] : \vania Vega presents with uterovaginal prolapse, normal PVR, negative CST. Visual illustrations were used to demonstrate prolapse. We reviewed management options for her prolapse including: observation, pelvic floor exercises with and without physical therapy, pessary, and surgical management. We reviewed the different types of surgical procedures including abdominal, vaginal, and robotic/laparoscopic procedures. In addition we reviewed procedures that involve hysterectomy as well as surgeries with uterine preservation. Mesh and non-mesh options were reviewed. IUGA information on prolapse was given to her.\par \par Will start with PFE and return in 6 months or sooner, all questions were answered\par \par [] u/a cx\par [] PFE

## 2022-05-31 NOTE — PROCEDURE
[Straight Catheterization] : insertion of a straight catheter [Urinary Frequency] : urinary frequency [Patient] : the patient [___ Fr Straight Tip] : a [unfilled] in [place] straight tip catheter [None] : there were no complications with the catheter insertion [No Complications] : no complications [Tolerated Well] : the patient tolerated the procedure well [Post procedure instructions and information given] : Post procedure instructions and information were given and reviewed with patient. [0] : 0

## 2022-06-01 LAB
APPEARANCE: CLEAR
BACTERIA: NEGATIVE
BILIRUBIN URINE: NEGATIVE
BLOOD URINE: NEGATIVE
COLOR: COLORLESS
GLUCOSE QUALITATIVE U: NEGATIVE
HYALINE CASTS: 0 /LPF
KETONES URINE: NEGATIVE
LEUKOCYTE ESTERASE URINE: NEGATIVE
MICROSCOPIC-UA: NORMAL
NITRITE URINE: NEGATIVE
PH URINE: 6
PROTEIN URINE: NEGATIVE
RED BLOOD CELLS URINE: 0 /HPF
SPECIFIC GRAVITY URINE: 1.01
SQUAMOUS EPITHELIAL CELLS: 0 /HPF
UROBILINOGEN URINE: NORMAL
WHITE BLOOD CELLS URINE: 0 /HPF

## 2022-06-02 LAB — BACTERIA UR CULT: NORMAL

## 2022-06-20 ENCOUNTER — APPOINTMENT (OUTPATIENT)
Dept: CARDIOLOGY | Facility: CLINIC | Age: 77
End: 2022-06-20

## 2022-07-11 ENCOUNTER — NON-APPOINTMENT (OUTPATIENT)
Age: 77
End: 2022-07-11

## 2022-07-11 ENCOUNTER — APPOINTMENT (OUTPATIENT)
Dept: CARDIOLOGY | Facility: CLINIC | Age: 77
End: 2022-07-11

## 2022-07-11 VITALS
OXYGEN SATURATION: 96 % | HEIGHT: 64 IN | BODY MASS INDEX: 23.22 KG/M2 | DIASTOLIC BLOOD PRESSURE: 75 MMHG | WEIGHT: 136 LBS | HEART RATE: 75 BPM | SYSTOLIC BLOOD PRESSURE: 138 MMHG | TEMPERATURE: 98.1 F

## 2022-07-11 DIAGNOSIS — E55.9 VITAMIN D DEFICIENCY, UNSPECIFIED: ICD-10-CM

## 2022-07-11 DIAGNOSIS — E78.5 HYPERLIPIDEMIA, UNSPECIFIED: ICD-10-CM

## 2022-07-11 DIAGNOSIS — F32.9 MAJOR DEPRESSIVE DISORDER, SINGLE EPISODE, UNSPECIFIED: ICD-10-CM

## 2022-07-11 DIAGNOSIS — I65.29 OCCLUSION AND STENOSIS OF UNSPECIFIED CAROTID ARTERY: ICD-10-CM

## 2022-07-11 DIAGNOSIS — I10 ESSENTIAL (PRIMARY) HYPERTENSION: ICD-10-CM

## 2022-07-11 PROCEDURE — 93000 ELECTROCARDIOGRAM COMPLETE: CPT

## 2022-07-11 PROCEDURE — 99214 OFFICE O/P EST MOD 30 MIN: CPT

## 2022-07-11 RX ORDER — CYCLOBENZAPRINE HYDROCHLORIDE 10 MG/1
10 TABLET, FILM COATED ORAL
Qty: 15 | Refills: 0 | Status: DISCONTINUED | COMMUNITY
Start: 2021-06-12 | End: 2022-07-11

## 2022-07-13 PROBLEM — I10 HIGH BLOOD PRESSURE: Status: ACTIVE | Noted: 2022-05-31

## 2022-07-13 PROBLEM — I65.29 CAROTID ARTERY PLAQUE: Status: ACTIVE | Noted: 2022-07-11

## 2022-07-13 PROBLEM — F32.9 MAJOR DEPRESSIVE DISORDER WITH CURRENT ACTIVE EPISODE, UNSPECIFIED DEPRESSION EPISODE SEVERITY, UNSPECIFIED WHETHER RECURRENT: Status: ACTIVE | Noted: 2022-07-13

## 2022-07-13 NOTE — ASSESSMENT
[FreeTextEntry1] : Patient with history of hypertension, compliant with medications.  Blood pressure is to goal.\par She has valvular heart disease, exam is unchanged.\par Carotid plaque, repeat carotid duplex.\par Patient with hyperlipidemia has not had any blood work recently.  Continue with simvastatin.  Fasting blood work is ordered.\par Patient with major depression, has been on Prozac for many years.  Due to difficult life at this time with her  she is more depressed but denies any suicidal or homicidal ideation.  We will increase Prozac to 20 mg daily.  Side effects discussed with patient.  Advised to see PMD.

## 2022-07-13 NOTE — HISTORY OF PRESENT ILLNESS
[FreeTextEntry1] : Very pleasant 76- year-old female who is here with hypertension hyperlipidemia and valvular heart disease. \par  She is taking care of her  who is bedbound with Parkinsons and dementia. .  She also tries to walk.  She denies having any chest pain.  Her breathing is nonlabored.  She denies any palpitations syncope or presyncope.  \par \par She has not had blood work recently.\par \par Echocardiogram May 16, 2019 normal LV size and function LVEF 60%, normal RV size and function, trace mitral valve regurgitation.  Trivial pericardial effusion.\par \par Nuclear stress test 2016 normal LV size and function, LVEF 75%, normal blood pressure and heart rate response.  Negative nuclear stress test.\par EKG today shows a sinus rhythm the heart rate is 64 bpm, negative T waves are seen in leads V1 and V2, no new changes.

## 2022-07-13 NOTE — REVIEW OF SYSTEMS
[Fever] : no fever [Headache] : no headache [Chills] : no chills [Dyspnea on exertion] : not dyspnea during exertion [Leg Claudication] : no intermittent leg claudication [Palpitations] : no palpitations

## 2022-07-13 NOTE — REVIEW OF SYSTEMS
[Feeling Fatigued] : feeling fatigued [Weight Loss (___ Lbs)] : [unfilled] ~Ulb weight loss [Depression] : depression [Under Stress] : under stress [Negative] : Heme/Lymph

## 2022-07-13 NOTE — PHYSICAL EXAM
[Well Developed] : well developed [Well Nourished] : well nourished [No Acute Distress] : no acute distress [Normal Venous Pressure] : normal venous pressure [Normal S1, S2] : normal S1, S2 [No Murmur] : no murmur [No Rub] : no rub [No Gallop] : no gallop [Clear Lung Fields] : clear lung fields [Good Air Entry] : good air entry [No Respiratory Distress] : no respiratory distress  [Soft] : abdomen soft [Non Tender] : non-tender [No Masses/organomegaly] : no masses/organomegaly [Normal Bowel Sounds] : normal bowel sounds [Normal Gait] : normal gait [No Edema] : no edema [No Cyanosis] : no cyanosis [No Clubbing] : no clubbing [No Varicosities] : no varicosities [No Rash] : no rash [No Skin Lesions] : no skin lesions [Moves all extremities] : moves all extremities [No Focal Deficits] : no focal deficits [Normal Speech] : normal speech [Alert and Oriented] : alert and oriented [Normal memory] : normal memory

## 2022-07-20 ENCOUNTER — APPOINTMENT (OUTPATIENT)
Dept: CARDIOLOGY | Facility: CLINIC | Age: 77
End: 2022-07-20

## 2022-07-20 PROCEDURE — 93880 EXTRACRANIAL BILAT STUDY: CPT

## 2022-07-27 LAB
25(OH)D3 SERPL-MCNC: 24.7 NG/ML
ALBUMIN SERPL ELPH-MCNC: 4.8 G/DL
ALP BLD-CCNC: 85 U/L
ALT SERPL-CCNC: 13 U/L
ANION GAP SERPL CALC-SCNC: 10 MMOL/L
AST SERPL-CCNC: 37 U/L
BASOPHILS # BLD AUTO: 0.03 K/UL
BASOPHILS NFR BLD AUTO: 0.6 %
BILIRUB SERPL-MCNC: 0.5 MG/DL
BUN SERPL-MCNC: 17 MG/DL
CALCIUM SERPL-MCNC: 9.5 MG/DL
CHLORIDE SERPL-SCNC: 107 MMOL/L
CHOLEST SERPL-MCNC: 177 MG/DL
CO2 SERPL-SCNC: 27 MMOL/L
CREAT SERPL-MCNC: 0.57 MG/DL
EGFR: 94 ML/MIN/1.73M2
EOSINOPHIL # BLD AUTO: 0.12 K/UL
EOSINOPHIL NFR BLD AUTO: 2.3 %
GLUCOSE SERPL-MCNC: 100 MG/DL
HCT VFR BLD CALC: 43.9 %
HDLC SERPL-MCNC: 52 MG/DL
HGB BLD-MCNC: 14.3 G/DL
IMM GRANULOCYTES NFR BLD AUTO: 0.2 %
LDLC SERPL CALC-MCNC: 100 MG/DL
LYMPHOCYTES # BLD AUTO: 1.46 K/UL
LYMPHOCYTES NFR BLD AUTO: 27.4 %
MAN DIFF?: NORMAL
MCHC RBC-ENTMCNC: 30.8 PG
MCHC RBC-ENTMCNC: 32.6 GM/DL
MCV RBC AUTO: 94.6 FL
MONOCYTES # BLD AUTO: 0.43 K/UL
MONOCYTES NFR BLD AUTO: 8.1 %
NEUTROPHILS # BLD AUTO: 3.27 K/UL
NEUTROPHILS NFR BLD AUTO: 61.4 %
NONHDLC SERPL-MCNC: 124 MG/DL
PLATELET # BLD AUTO: 260 K/UL
POTASSIUM SERPL-SCNC: 4.7 MMOL/L
PROT SERPL-MCNC: 6.7 G/DL
RBC # BLD: 4.64 M/UL
RBC # FLD: 13.3 %
SODIUM SERPL-SCNC: 144 MMOL/L
TRIGL SERPL-MCNC: 123 MG/DL
WBC # FLD AUTO: 5.32 K/UL

## 2022-07-27 RX ORDER — ATORVASTATIN CALCIUM 20 MG/1
20 TABLET, FILM COATED ORAL
Qty: 1 | Refills: 2 | Status: ACTIVE | COMMUNITY
Start: 2022-07-27 | End: 1900-01-01

## 2022-09-09 ENCOUNTER — TRANSCRIPTION ENCOUNTER (OUTPATIENT)
Age: 77
End: 2022-09-09

## 2022-09-09 ENCOUNTER — INPATIENT (INPATIENT)
Facility: HOSPITAL | Age: 77
LOS: 1 days | Discharge: HOME | DRG: 343 | End: 2022-09-11
Attending: SURGERY | Admitting: SURGERY
Payer: COMMERCIAL

## 2022-09-09 VITALS
WEIGHT: 119.93 LBS | TEMPERATURE: 100 F | OXYGEN SATURATION: 98 % | SYSTOLIC BLOOD PRESSURE: 204 MMHG | DIASTOLIC BLOOD PRESSURE: 87 MMHG | HEART RATE: 114 BPM | RESPIRATION RATE: 18 BRPM

## 2022-09-09 DIAGNOSIS — Z98.890 OTHER SPECIFIED POSTPROCEDURAL STATES: Chronic | ICD-10-CM

## 2022-09-09 LAB
ALBUMIN SERPL ELPH-MCNC: 4.9 G/DL — SIGNIFICANT CHANGE UP (ref 3.3–5.2)
ALP SERPL-CCNC: 102 U/L — SIGNIFICANT CHANGE UP (ref 40–120)
ALT FLD-CCNC: 13 U/L — SIGNIFICANT CHANGE UP
ANION GAP SERPL CALC-SCNC: 18 MMOL/L — HIGH (ref 5–17)
AST SERPL-CCNC: 39 U/L — HIGH
BASE EXCESS BLDV CALC-SCNC: 4.3 MMOL/L — HIGH (ref -2–3)
BASOPHILS # BLD AUTO: 0.01 K/UL — SIGNIFICANT CHANGE UP (ref 0–0.2)
BASOPHILS NFR BLD AUTO: 0.1 % — SIGNIFICANT CHANGE UP (ref 0–2)
BILIRUB SERPL-MCNC: 1 MG/DL — SIGNIFICANT CHANGE UP (ref 0.4–2)
BUN SERPL-MCNC: 13.6 MG/DL — SIGNIFICANT CHANGE UP (ref 8–20)
CA-I SERPL-SCNC: 1.12 MMOL/L — LOW (ref 1.15–1.33)
CALCIUM SERPL-MCNC: 9.6 MG/DL — SIGNIFICANT CHANGE UP (ref 8.4–10.5)
CHLORIDE BLDV-SCNC: 100 MMOL/L — SIGNIFICANT CHANGE UP (ref 98–107)
CHLORIDE SERPL-SCNC: 99 MMOL/L — SIGNIFICANT CHANGE UP (ref 98–107)
CO2 SERPL-SCNC: 22 MMOL/L — SIGNIFICANT CHANGE UP (ref 22–29)
CREAT SERPL-MCNC: 0.47 MG/DL — LOW (ref 0.5–1.3)
EGFR: 98 ML/MIN/1.73M2 — SIGNIFICANT CHANGE UP
EOSINOPHIL # BLD AUTO: 0 K/UL — SIGNIFICANT CHANGE UP (ref 0–0.5)
EOSINOPHIL NFR BLD AUTO: 0 % — SIGNIFICANT CHANGE UP (ref 0–6)
FLUAV AG NPH QL: SIGNIFICANT CHANGE UP
FLUBV AG NPH QL: SIGNIFICANT CHANGE UP
GAS PNL BLDV: 133 MMOL/L — LOW (ref 136–145)
GAS PNL BLDV: SIGNIFICANT CHANGE UP
GAS PNL BLDV: SIGNIFICANT CHANGE UP
GLUCOSE BLDV-MCNC: 179 MG/DL — HIGH (ref 70–99)
GLUCOSE SERPL-MCNC: 160 MG/DL — HIGH (ref 70–99)
HCO3 BLDV-SCNC: 28 MMOL/L — SIGNIFICANT CHANGE UP (ref 22–29)
HCT VFR BLD CALC: 44.4 % — SIGNIFICANT CHANGE UP (ref 34.5–45)
HCT VFR BLDA CALC: 46 % — SIGNIFICANT CHANGE UP
HGB BLD CALC-MCNC: 15.4 G/DL — SIGNIFICANT CHANGE UP (ref 11.7–16.1)
HGB BLD-MCNC: 15.2 G/DL — SIGNIFICANT CHANGE UP (ref 11.5–15.5)
IMM GRANULOCYTES NFR BLD AUTO: 0.6 % — SIGNIFICANT CHANGE UP (ref 0–1.5)
LACTATE BLDV-MCNC: 2 MMOL/L — SIGNIFICANT CHANGE UP (ref 0.5–2)
LIDOCAIN IGE QN: 15 U/L — LOW (ref 22–51)
LYMPHOCYTES # BLD AUTO: 0.85 K/UL — LOW (ref 1–3.3)
LYMPHOCYTES # BLD AUTO: 7.4 % — LOW (ref 13–44)
MCHC RBC-ENTMCNC: 30.6 PG — SIGNIFICANT CHANGE UP (ref 27–34)
MCHC RBC-ENTMCNC: 34.2 GM/DL — SIGNIFICANT CHANGE UP (ref 32–36)
MCV RBC AUTO: 89.5 FL — SIGNIFICANT CHANGE UP (ref 80–100)
MONOCYTES # BLD AUTO: 0.39 K/UL — SIGNIFICANT CHANGE UP (ref 0–0.9)
MONOCYTES NFR BLD AUTO: 3.4 % — SIGNIFICANT CHANGE UP (ref 2–14)
NEUTROPHILS # BLD AUTO: 10.24 K/UL — HIGH (ref 1.8–7.4)
NEUTROPHILS NFR BLD AUTO: 88.5 % — HIGH (ref 43–77)
PCO2 BLDV: 42 MMHG — SIGNIFICANT CHANGE UP (ref 39–42)
PH BLDV: 7.44 — HIGH (ref 7.32–7.43)
PLATELET # BLD AUTO: 267 K/UL — SIGNIFICANT CHANGE UP (ref 150–400)
PO2 BLDV: 64 MMHG — HIGH (ref 25–45)
POTASSIUM BLDV-SCNC: 4.1 MMOL/L — SIGNIFICANT CHANGE UP (ref 3.5–5.1)
POTASSIUM SERPL-MCNC: 3.9 MMOL/L — SIGNIFICANT CHANGE UP (ref 3.5–5.3)
POTASSIUM SERPL-SCNC: 3.9 MMOL/L — SIGNIFICANT CHANGE UP (ref 3.5–5.3)
PROT SERPL-MCNC: 7.4 G/DL — SIGNIFICANT CHANGE UP (ref 6.6–8.7)
RBC # BLD: 4.96 M/UL — SIGNIFICANT CHANGE UP (ref 3.8–5.2)
RBC # FLD: 12.9 % — SIGNIFICANT CHANGE UP (ref 10.3–14.5)
RSV RNA NPH QL NAA+NON-PROBE: SIGNIFICANT CHANGE UP
SAO2 % BLDV: 92.8 % — SIGNIFICANT CHANGE UP
SARS-COV-2 RNA SPEC QL NAA+PROBE: SIGNIFICANT CHANGE UP
SODIUM SERPL-SCNC: 139 MMOL/L — SIGNIFICANT CHANGE UP (ref 135–145)
WBC # BLD: 11.56 K/UL — HIGH (ref 3.8–10.5)
WBC # FLD AUTO: 11.56 K/UL — HIGH (ref 3.8–10.5)

## 2022-09-09 PROCEDURE — 74019 RADEX ABDOMEN 2 VIEWS: CPT | Mod: 26

## 2022-09-09 PROCEDURE — 99285 EMERGENCY DEPT VISIT HI MDM: CPT

## 2022-09-09 RX ORDER — MORPHINE SULFATE 50 MG/1
4 CAPSULE, EXTENDED RELEASE ORAL ONCE
Refills: 0 | Status: DISCONTINUED | OUTPATIENT
Start: 2022-09-09 | End: 2022-09-09

## 2022-09-09 RX ORDER — ONDANSETRON 8 MG/1
4 TABLET, FILM COATED ORAL ONCE
Refills: 0 | Status: COMPLETED | OUTPATIENT
Start: 2022-09-09 | End: 2022-09-09

## 2022-09-09 RX ORDER — SODIUM CHLORIDE 9 MG/ML
1000 INJECTION INTRAMUSCULAR; INTRAVENOUS; SUBCUTANEOUS ONCE
Refills: 0 | Status: COMPLETED | OUTPATIENT
Start: 2022-09-09 | End: 2022-09-09

## 2022-09-09 RX ADMIN — SODIUM CHLORIDE 1000 MILLILITER(S): 9 INJECTION INTRAMUSCULAR; INTRAVENOUS; SUBCUTANEOUS at 21:10

## 2022-09-09 RX ADMIN — MORPHINE SULFATE 4 MILLIGRAM(S): 50 CAPSULE, EXTENDED RELEASE ORAL at 21:12

## 2022-09-09 RX ADMIN — ONDANSETRON 4 MILLIGRAM(S): 8 TABLET, FILM COATED ORAL at 21:12

## 2022-09-09 NOTE — ED PROVIDER NOTE - CONSTITUTIONAL, MLM
normal... Uncomfortable appearing, awake, alert, oriented to person, place, time/situation and moderate distress.

## 2022-09-09 NOTE — ED ADULT NURSE NOTE - NSIMPLEMENTINTERV_GEN_ALL_ED
Implemented All Universal Safety Interventions:  [place] to call system. Call bell, personal items and telephone within reach. Instruct patient to call for assistance. Room bathroom lighting operational. Non-slip footwear when patient is off stretcher. Physically safe environment: no spills, clutter or unnecessary equipment. Stretcher in lowest position, wheels locked, appropriate side rails in place.

## 2022-09-09 NOTE — ED PROVIDER NOTE - CLINICAL SUMMARY MEDICAL DECISION MAKING FREE TEXT BOX
Acute abdominal pain with CT findings suggestive of acute appendicitis will be admitted to surgery for management.

## 2022-09-09 NOTE — ED ADULT NURSE NOTE - OBJECTIVE STATEMENT
Assumed care of pt at 2100 Pt A&Ox4 c/o 7/10 abdominal pain accompanied by n/v. Pt states it began at 0300. Pt states she is vomiting bile, Pt denies having any diarrhea. Pt afebrile. Assumed care of pt at 2100 Pt A&Ox4 c/o 7/10 abdominal pain accompanied by n/v. Pt states it began at 0300. Pt states she is vomiting bile, Pt denies having any diarrhea. Pt afebrile. Pt not diaphoretic. PMH of IBS, HTN, and lactose intolerance. Pt HR 98.

## 2022-09-09 NOTE — ED PROVIDER NOTE - OBJECTIVE STATEMENT
77yof w/ HTN, HLD p/w abd pain. acute onset of generalized abdominal pain around 3am without any trigger, constant since onset but waves of increasing severity, associated with bilious vomiting. Had a normal BM earlier today but no flatus since then. Endorses chills but no measured fever. No urinary symptoms.

## 2022-09-10 ENCOUNTER — RESULT REVIEW (OUTPATIENT)
Age: 77
End: 2022-09-10

## 2022-09-10 ENCOUNTER — TRANSCRIPTION ENCOUNTER (OUTPATIENT)
Age: 77
End: 2022-09-10

## 2022-09-10 DIAGNOSIS — K35.80 UNSPECIFIED ACUTE APPENDICITIS: ICD-10-CM

## 2022-09-10 LAB
ANION GAP SERPL CALC-SCNC: 13 MMOL/L — SIGNIFICANT CHANGE UP (ref 5–17)
APPEARANCE UR: CLEAR — SIGNIFICANT CHANGE UP
APTT BLD: 30.8 SEC — SIGNIFICANT CHANGE UP (ref 27.5–35.5)
BACTERIA # UR AUTO: ABNORMAL
BASOPHILS # BLD AUTO: 0 K/UL — SIGNIFICANT CHANGE UP (ref 0–0.2)
BASOPHILS NFR BLD AUTO: 0 % — SIGNIFICANT CHANGE UP (ref 0–2)
BILIRUB UR-MCNC: NEGATIVE — SIGNIFICANT CHANGE UP
BLD GP AB SCN SERPL QL: SIGNIFICANT CHANGE UP
BUN SERPL-MCNC: 11.1 MG/DL — SIGNIFICANT CHANGE UP (ref 8–20)
CALCIUM SERPL-MCNC: 9.1 MG/DL — SIGNIFICANT CHANGE UP (ref 8.4–10.5)
CHLORIDE SERPL-SCNC: 101 MMOL/L — SIGNIFICANT CHANGE UP (ref 98–107)
CO2 SERPL-SCNC: 24 MMOL/L — SIGNIFICANT CHANGE UP (ref 22–29)
COLOR SPEC: YELLOW — SIGNIFICANT CHANGE UP
CREAT SERPL-MCNC: 0.47 MG/DL — LOW (ref 0.5–1.3)
DIFF PNL FLD: ABNORMAL
EGFR: 98 ML/MIN/1.73M2 — SIGNIFICANT CHANGE UP
EOSINOPHIL # BLD AUTO: 0 K/UL — SIGNIFICANT CHANGE UP (ref 0–0.5)
EOSINOPHIL NFR BLD AUTO: 0 % — SIGNIFICANT CHANGE UP (ref 0–6)
EPI CELLS # UR: ABNORMAL
GLUCOSE SERPL-MCNC: 121 MG/DL — HIGH (ref 70–99)
GLUCOSE UR QL: NEGATIVE MG/DL — SIGNIFICANT CHANGE UP
HCT VFR BLD CALC: 40.9 % — SIGNIFICANT CHANGE UP (ref 34.5–45)
HGB BLD-MCNC: 14.2 G/DL — SIGNIFICANT CHANGE UP (ref 11.5–15.5)
INR BLD: 1.32 RATIO — HIGH (ref 0.88–1.16)
KETONES UR-MCNC: ABNORMAL
LEUKOCYTE ESTERASE UR-ACNC: ABNORMAL
LYMPHOCYTES # BLD AUTO: 0.51 K/UL — LOW (ref 1–3.3)
LYMPHOCYTES # BLD AUTO: 5.3 % — LOW (ref 13–44)
MAGNESIUM SERPL-MCNC: 1.6 MG/DL — SIGNIFICANT CHANGE UP (ref 1.6–2.6)
MANUAL SMEAR VERIFICATION: SIGNIFICANT CHANGE UP
MCHC RBC-ENTMCNC: 31.4 PG — SIGNIFICANT CHANGE UP (ref 27–34)
MCHC RBC-ENTMCNC: 34.7 GM/DL — SIGNIFICANT CHANGE UP (ref 32–36)
MCV RBC AUTO: 90.5 FL — SIGNIFICANT CHANGE UP (ref 80–100)
METAMYELOCYTES # FLD: 1.8 % — HIGH (ref 0–0)
MONOCYTES # BLD AUTO: 0.16 K/UL — SIGNIFICANT CHANGE UP (ref 0–0.9)
MONOCYTES NFR BLD AUTO: 1.7 % — LOW (ref 2–14)
NEUTROPHILS # BLD AUTO: 8.22 K/UL — HIGH (ref 1.8–7.4)
NEUTROPHILS NFR BLD AUTO: 71 % — SIGNIFICANT CHANGE UP (ref 43–77)
NEUTS BAND # BLD: 14.9 % — HIGH (ref 0–8)
NITRITE UR-MCNC: NEGATIVE — SIGNIFICANT CHANGE UP
PH UR: 5 — SIGNIFICANT CHANGE UP (ref 5–8)
PHOSPHATE SERPL-MCNC: 2.7 MG/DL — SIGNIFICANT CHANGE UP (ref 2.4–4.7)
PLAT MORPH BLD: NORMAL — SIGNIFICANT CHANGE UP
PLATELET # BLD AUTO: 240 K/UL — SIGNIFICANT CHANGE UP (ref 150–400)
POTASSIUM SERPL-MCNC: 3.6 MMOL/L — SIGNIFICANT CHANGE UP (ref 3.5–5.3)
POTASSIUM SERPL-SCNC: 3.6 MMOL/L — SIGNIFICANT CHANGE UP (ref 3.5–5.3)
PROT UR-MCNC: 15
PROTHROM AB SERPL-ACNC: 15.4 SEC — HIGH (ref 10.5–13.4)
RBC # BLD: 4.52 M/UL — SIGNIFICANT CHANGE UP (ref 3.8–5.2)
RBC # FLD: 13.2 % — SIGNIFICANT CHANGE UP (ref 10.3–14.5)
RBC BLD AUTO: NORMAL — SIGNIFICANT CHANGE UP
RBC CASTS # UR COMP ASSIST: ABNORMAL /HPF (ref 0–4)
SMUDGE CELLS # BLD: PRESENT — SIGNIFICANT CHANGE UP
SODIUM SERPL-SCNC: 138 MMOL/L — SIGNIFICANT CHANGE UP (ref 135–145)
SP GR SPEC: 1.01 — SIGNIFICANT CHANGE UP (ref 1.01–1.02)
UROBILINOGEN FLD QL: NEGATIVE MG/DL — SIGNIFICANT CHANGE UP
VARIANT LYMPHS # BLD: 5.3 % — SIGNIFICANT CHANGE UP (ref 0–6)
WBC # BLD: 9.57 K/UL — SIGNIFICANT CHANGE UP (ref 3.8–10.5)
WBC # FLD AUTO: 9.57 K/UL — SIGNIFICANT CHANGE UP (ref 3.8–10.5)
WBC UR QL: ABNORMAL /HPF (ref 0–5)

## 2022-09-10 PROCEDURE — 71045 X-RAY EXAM CHEST 1 VIEW: CPT | Mod: 26

## 2022-09-10 PROCEDURE — 88304 TISSUE EXAM BY PATHOLOGIST: CPT | Mod: 26

## 2022-09-10 PROCEDURE — 93010 ELECTROCARDIOGRAM REPORT: CPT

## 2022-09-10 PROCEDURE — 74177 CT ABD & PELVIS W/CONTRAST: CPT | Mod: 26,MA

## 2022-09-10 PROCEDURE — 99222 1ST HOSP IP/OBS MODERATE 55: CPT | Mod: 57

## 2022-09-10 PROCEDURE — 44970 LAPAROSCOPY APPENDECTOMY: CPT

## 2022-09-10 DEVICE — STAPLER COVIDIEN TRI-STAPLE CURVED 45MM TAN RELOAD: Type: IMPLANTABLE DEVICE | Status: FUNCTIONAL

## 2022-09-10 DEVICE — CLIP APPLIER ETHICON LIGACLIP 10MM: Type: IMPLANTABLE DEVICE | Status: FUNCTIONAL

## 2022-09-10 DEVICE — STAPLER COVIDIEN TRI-STAPLE 60MM PURPLE RELOAD: Type: IMPLANTABLE DEVICE | Status: FUNCTIONAL

## 2022-09-10 DEVICE — STAPLER COVIDIEN TA 30 RED RELOAD: Type: IMPLANTABLE DEVICE | Status: FUNCTIONAL

## 2022-09-10 RX ORDER — HYDROMORPHONE HYDROCHLORIDE 2 MG/ML
1 INJECTION INTRAMUSCULAR; INTRAVENOUS; SUBCUTANEOUS
Refills: 0 | Status: DISCONTINUED | OUTPATIENT
Start: 2022-09-10 | End: 2022-09-10

## 2022-09-10 RX ORDER — ENOXAPARIN SODIUM 100 MG/ML
40 INJECTION SUBCUTANEOUS EVERY 24 HOURS
Refills: 0 | Status: DISCONTINUED | OUTPATIENT
Start: 2022-09-10 | End: 2022-09-11

## 2022-09-10 RX ORDER — KETOROLAC TROMETHAMINE 30 MG/ML
15 SYRINGE (ML) INJECTION EVERY 4 HOURS
Refills: 0 | Status: DISCONTINUED | OUTPATIENT
Start: 2022-09-10 | End: 2022-09-11

## 2022-09-10 RX ORDER — MAGNESIUM SULFATE 500 MG/ML
2 VIAL (ML) INJECTION ONCE
Refills: 0 | Status: COMPLETED | OUTPATIENT
Start: 2022-09-10 | End: 2022-09-10

## 2022-09-10 RX ORDER — SODIUM CHLORIDE 9 MG/ML
1000 INJECTION, SOLUTION INTRAVENOUS
Refills: 0 | Status: DISCONTINUED | OUTPATIENT
Start: 2022-09-10 | End: 2022-09-10

## 2022-09-10 RX ORDER — CEFOTETAN DISODIUM 1 G
2 VIAL (EA) INJECTION EVERY 12 HOURS
Refills: 0 | Status: COMPLETED | OUTPATIENT
Start: 2022-09-10 | End: 2022-09-11

## 2022-09-10 RX ORDER — ACETAMINOPHEN 500 MG
650 TABLET ORAL EVERY 6 HOURS
Refills: 0 | Status: DISCONTINUED | OUTPATIENT
Start: 2022-09-10 | End: 2022-09-11

## 2022-09-10 RX ORDER — SODIUM CHLORIDE 9 MG/ML
1000 INJECTION, SOLUTION INTRAVENOUS
Refills: 0 | Status: DISCONTINUED | OUTPATIENT
Start: 2022-09-10 | End: 2022-09-11

## 2022-09-10 RX ORDER — ATORVASTATIN CALCIUM 80 MG/1
1 TABLET, FILM COATED ORAL
Qty: 0 | Refills: 0 | DISCHARGE

## 2022-09-10 RX ORDER — LOSARTAN POTASSIUM 100 MG/1
1 TABLET, FILM COATED ORAL
Qty: 0 | Refills: 0 | DISCHARGE

## 2022-09-10 RX ORDER — LOSARTAN POTASSIUM 100 MG/1
100 TABLET, FILM COATED ORAL DAILY
Refills: 0 | Status: DISCONTINUED | OUTPATIENT
Start: 2022-09-10 | End: 2022-09-11

## 2022-09-10 RX ORDER — ATORVASTATIN CALCIUM 80 MG/1
20 TABLET, FILM COATED ORAL AT BEDTIME
Refills: 0 | Status: DISCONTINUED | OUTPATIENT
Start: 2022-09-10 | End: 2022-09-11

## 2022-09-10 RX ORDER — ERTAPENEM SODIUM 1 G/1
1000 INJECTION, POWDER, LYOPHILIZED, FOR SOLUTION INTRAMUSCULAR; INTRAVENOUS EVERY 24 HOURS
Refills: 0 | Status: DISCONTINUED | OUTPATIENT
Start: 2022-09-10 | End: 2022-09-10

## 2022-09-10 RX ORDER — POTASSIUM CHLORIDE 20 MEQ
10 PACKET (EA) ORAL
Refills: 0 | Status: COMPLETED | OUTPATIENT
Start: 2022-09-10 | End: 2022-09-10

## 2022-09-10 RX ORDER — HYDROMORPHONE HYDROCHLORIDE 2 MG/ML
0.5 INJECTION INTRAMUSCULAR; INTRAVENOUS; SUBCUTANEOUS
Refills: 0 | Status: DISCONTINUED | OUTPATIENT
Start: 2022-09-10 | End: 2022-09-10

## 2022-09-10 RX ORDER — ONDANSETRON 8 MG/1
4 TABLET, FILM COATED ORAL ONCE
Refills: 0 | Status: DISCONTINUED | OUTPATIENT
Start: 2022-09-10 | End: 2022-09-11

## 2022-09-10 RX ADMIN — Medication 25 GRAM(S): at 16:00

## 2022-09-10 RX ADMIN — SODIUM CHLORIDE 100 MILLILITER(S): 9 INJECTION, SOLUTION INTRAVENOUS at 23:15

## 2022-09-10 RX ADMIN — Medication 100 MILLIEQUIVALENT(S): at 17:03

## 2022-09-10 RX ADMIN — Medication 100 GRAM(S): at 17:52

## 2022-09-10 RX ADMIN — ATORVASTATIN CALCIUM 20 MILLIGRAM(S): 80 TABLET, FILM COATED ORAL at 21:25

## 2022-09-10 RX ADMIN — Medication 650 MILLIGRAM(S): at 19:33

## 2022-09-10 RX ADMIN — Medication 100 MILLIEQUIVALENT(S): at 09:24

## 2022-09-10 RX ADMIN — SODIUM CHLORIDE 100 MILLILITER(S): 9 INJECTION, SOLUTION INTRAVENOUS at 07:24

## 2022-09-10 RX ADMIN — Medication 100 MILLIEQUIVALENT(S): at 17:53

## 2022-09-10 RX ADMIN — Medication 650 MILLIGRAM(S): at 23:16

## 2022-09-10 RX ADMIN — Medication 650 MILLIGRAM(S): at 23:15

## 2022-09-10 RX ADMIN — SODIUM CHLORIDE 1000 MILLILITER(S): 9 INJECTION INTRAMUSCULAR; INTRAVENOUS; SUBCUTANEOUS at 05:51

## 2022-09-10 RX ADMIN — ERTAPENEM SODIUM 120 MILLIGRAM(S): 1 INJECTION, POWDER, LYOPHILIZED, FOR SOLUTION INTRAMUSCULAR; INTRAVENOUS at 07:01

## 2022-09-10 NOTE — H&P ADULT - NSHPLABSRESULTS_GEN_ALL_CORE
Vital Signs Last 24 Hrs  T(C): 36.7 (10 Sep 2022 05:21), Max: 37.7 (09 Sep 2022 20:10)  T(F): 98.1 (10 Sep 2022 05:21), Max: 99.8 (09 Sep 2022 20:10)  HR: 92 (10 Sep 2022 05:21) (89 - 114)  BP: 162/90 (10 Sep 2022 05:21) (160/89 - 204/87)  BP(mean): --  RR: 18 (10 Sep 2022 05:21) (18 - 20)  SpO2: 98% (10 Sep 2022 05:21) (91% - 98%)    Parameters below as of 10 Sep 2022 05:21  Patient On (Oxygen Delivery Method): room air          LABS:                        15.2   11.56 )-----------( 267      ( 09 Sep 2022 21:10 )             44.4         139  |  99  |  13.6  ----------------------------<  160<H>  3.9   |  22.0  |  0.47<L>    Ca    9.6      09 Sep 2022 21:10    TPro  7.4  /  Alb  4.9  /  TBili  1.0  /  DBili  x   /  AST  39<H>  /  ALT  13  /  AlkPhos  102  09-09      Urinalysis Basic - ( 10 Sep 2022 02:08 )    Color: Yellow / Appearance: Clear / S.010 / pH: x  Gluc: x / Ketone: Small  / Bili: Negative / Urobili: Negative mg/dL   Blood: x / Protein: 15 / Nitrite: Negative   Leuk Esterase: Moderate / RBC: 3-5 /HPF / WBC 11-25 /HPF   Sq Epi: x / Non Sq Epi: Moderate / Bacteria: Few      IMAGING:  CT A/P  IMPRESSION:  Mildly thick-walled small bowel loops with adjacent mesenteric edema and small volume ascites, which may represent a nonspecific enteritis. More so, appendix is fluid-filled and thickened, measuring up to 1.4 cm, compatible with acute appendicitis, which may be secondary to adjacent bowel inflammation.  Nonspecific groundglass opacities in the lung bases, which were likely infectious versus inflammatory.    WILLIAM MAYO MD; Attending Radiologist  This document has been electronically signed. Sep 10 2022 1:37AM

## 2022-09-10 NOTE — H&P ADULT - ASSESSMENT
78yo F w/ PMH of HTN, HLD, and IBS who presents to ED with abdominal pain that began yesterday morning around 3AM. Described as sharp pain across upper and lower abdomen. Associated with nausea, vomiting and chills. Denies fevers, chest pain, or SOB. Follows with GI, Dr. London outpatient. Had an appointment a few months ago for colonoscopy, but had syncopal episode day prior after prep. BM have been regular, but positive stool CA screening test.  78yo F w/ PMH of HTN, HLD, and IBS who presents to ED with abdominal pain that began yesterday morning around 3AM, found with acute appendicitis. Leukocytosis to 11.5, afebrile. CT also with what appears to be reactive inflammation of small bowel. Will plan for appendectomy which patient is amenabel to. HD stable otherwise, no other complaints.     #Acute Appendicitis   - Admit to ACS under Dr. Zepeda  - NPO / IVF  - Added on for lap appendectomy  - IV abx- invanz  - PRN pain meds  - Resumed pertinent home meds  - DVT ppx      Patient seen and plan discussed with Dr. Zepeda who agrees.

## 2022-09-10 NOTE — H&P ADULT - HISTORY OF PRESENT ILLNESS
Admit to ACS,  adding on for lap appendectomy  NPO/IVF  cefotetan    Full note to follow. 78yo F w/ PMH of HTN, HLD, and IBS who presents to ED with abdominal pain that began yesterday morning around 3AM. Described as sharp pain across upper and lower abdomen. Associated with nausea, vomiting and chills. Denies fevers, chest pain, or SOB. Follows with GI, Dr. London outpatient. Had an appointment a few months ago for colonoscopy, but had syncopal episode day prior after prep. BM have been regular, but positive stool CA screening test.

## 2022-09-10 NOTE — H&P ADULT - ATTENDING COMMENTS
I have seen and examined the patient in the ED at 0450hrs  Details as above  In summary 78 y/o acute onset of generalized abx pain ow in RLQ, nausea and chills, denies fevers.  last C scope chely 5 yrs ago, positive cologuard in January, could not tolerate prep for colonoscopy.  On exam non toxci  Abd obese soft, positive McBurney signs.  CT acute appendicitis, appendix dilated to 1.4 cm, surrounding non specific eneteritis.    A/P  Acute appendicitis and enteritis   Due to her age, previous + cologuard, there is an elevated risk of neoplasia in her age group as the etiology of appendicitis.  I explained the procedure, risk and benefits.  Admit to Surgery  prepare for OR  Invanz.

## 2022-09-10 NOTE — H&P ADULT - NSHPPHYSICALEXAM_GEN_ALL_CORE
GENERAL: Laying in bed in NAD, following commands, answering appropriately.   HEAD:  Atraumatic, Normocephalic  EYES: EOMI, conjunctiva and sclera clear  NECK: Supple, No JVD  CHEST/LUNG: Unlabored, no conversational dyspnea  HEART: Regular rate and rhythm;   ABDOMEN: Soft significantly tender in RLQ, moderately tender at upper abd and LLQ, no rebound, some voluntary guarding  EXTREMITIES:  WWP, No clubbing, cyanosis, or edema  PSYCH: AAOx3  NEUROLOGY: non-focal  SKIN: No rashes or lesions

## 2022-09-11 ENCOUNTER — TRANSCRIPTION ENCOUNTER (OUTPATIENT)
Age: 77
End: 2022-09-11

## 2022-09-11 VITALS
HEART RATE: 65 BPM | SYSTOLIC BLOOD PRESSURE: 130 MMHG | TEMPERATURE: 98 F | OXYGEN SATURATION: 95 % | DIASTOLIC BLOOD PRESSURE: 77 MMHG | RESPIRATION RATE: 14 BRPM

## 2022-09-11 LAB
CULTURE RESULTS: SIGNIFICANT CHANGE UP
HCV AB S/CO SERPL IA: 0.79 S/CO — SIGNIFICANT CHANGE UP (ref 0–0.99)
HCV AB SERPL-IMP: SIGNIFICANT CHANGE UP
SPECIMEN SOURCE: SIGNIFICANT CHANGE UP

## 2022-09-11 PROCEDURE — 85025 COMPLETE CBC W/AUTO DIFF WBC: CPT

## 2022-09-11 PROCEDURE — 81001 URINALYSIS AUTO W/SCOPE: CPT

## 2022-09-11 PROCEDURE — 86803 HEPATITIS C AB TEST: CPT

## 2022-09-11 PROCEDURE — 80048 BASIC METABOLIC PNL TOTAL CA: CPT

## 2022-09-11 PROCEDURE — 80053 COMPREHEN METABOLIC PANEL: CPT

## 2022-09-11 PROCEDURE — 99024 POSTOP FOLLOW-UP VISIT: CPT | Mod: GC

## 2022-09-11 PROCEDURE — 83735 ASSAY OF MAGNESIUM: CPT

## 2022-09-11 PROCEDURE — 82330 ASSAY OF CALCIUM: CPT

## 2022-09-11 PROCEDURE — 71045 X-RAY EXAM CHEST 1 VIEW: CPT

## 2022-09-11 PROCEDURE — 88304 TISSUE EXAM BY PATHOLOGIST: CPT

## 2022-09-11 PROCEDURE — 36415 COLL VENOUS BLD VENIPUNCTURE: CPT

## 2022-09-11 PROCEDURE — 85014 HEMATOCRIT: CPT

## 2022-09-11 PROCEDURE — 86901 BLOOD TYPING SEROLOGIC RH(D): CPT

## 2022-09-11 PROCEDURE — 96361 HYDRATE IV INFUSION ADD-ON: CPT

## 2022-09-11 PROCEDURE — 82947 ASSAY GLUCOSE BLOOD QUANT: CPT

## 2022-09-11 PROCEDURE — 86850 RBC ANTIBODY SCREEN: CPT

## 2022-09-11 PROCEDURE — 93005 ELECTROCARDIOGRAM TRACING: CPT

## 2022-09-11 PROCEDURE — 85610 PROTHROMBIN TIME: CPT

## 2022-09-11 PROCEDURE — 85018 HEMOGLOBIN: CPT

## 2022-09-11 PROCEDURE — 74177 CT ABD & PELVIS W/CONTRAST: CPT | Mod: MA

## 2022-09-11 PROCEDURE — 74019 RADEX ABDOMEN 2 VIEWS: CPT

## 2022-09-11 PROCEDURE — 83690 ASSAY OF LIPASE: CPT

## 2022-09-11 PROCEDURE — 84295 ASSAY OF SERUM SODIUM: CPT

## 2022-09-11 PROCEDURE — 87637 SARSCOV2&INF A&B&RSV AMP PRB: CPT

## 2022-09-11 PROCEDURE — 84132 ASSAY OF SERUM POTASSIUM: CPT

## 2022-09-11 PROCEDURE — 87086 URINE CULTURE/COLONY COUNT: CPT

## 2022-09-11 PROCEDURE — 99285 EMERGENCY DEPT VISIT HI MDM: CPT

## 2022-09-11 PROCEDURE — 44970 LAPAROSCOPY APPENDECTOMY: CPT

## 2022-09-11 PROCEDURE — 85730 THROMBOPLASTIN TIME PARTIAL: CPT

## 2022-09-11 PROCEDURE — 84100 ASSAY OF PHOSPHORUS: CPT

## 2022-09-11 PROCEDURE — 96374 THER/PROPH/DIAG INJ IV PUSH: CPT

## 2022-09-11 PROCEDURE — 83605 ASSAY OF LACTIC ACID: CPT

## 2022-09-11 PROCEDURE — 82435 ASSAY OF BLOOD CHLORIDE: CPT

## 2022-09-11 PROCEDURE — 86900 BLOOD TYPING SEROLOGIC ABO: CPT

## 2022-09-11 PROCEDURE — C1889: CPT

## 2022-09-11 PROCEDURE — 96375 TX/PRO/DX INJ NEW DRUG ADDON: CPT

## 2022-09-11 PROCEDURE — 82803 BLOOD GASES ANY COMBINATION: CPT

## 2022-09-11 RX ORDER — ACETAMINOPHEN 500 MG
2 TABLET ORAL
Qty: 0 | Refills: 0 | DISCHARGE
Start: 2022-09-11

## 2022-09-11 RX ADMIN — Medication 650 MILLIGRAM(S): at 05:20

## 2022-09-11 RX ADMIN — ENOXAPARIN SODIUM 40 MILLIGRAM(S): 100 INJECTION SUBCUTANEOUS at 05:15

## 2022-09-11 RX ADMIN — Medication 650 MILLIGRAM(S): at 11:38

## 2022-09-11 RX ADMIN — LOSARTAN POTASSIUM 100 MILLIGRAM(S): 100 TABLET, FILM COATED ORAL at 05:20

## 2022-09-11 RX ADMIN — Medication 20 MILLIGRAM(S): at 11:36

## 2022-09-11 RX ADMIN — Medication 650 MILLIGRAM(S): at 05:15

## 2022-09-11 RX ADMIN — Medication 650 MILLIGRAM(S): at 11:36

## 2022-09-11 RX ADMIN — Medication 100 GRAM(S): at 05:16

## 2022-09-11 NOTE — DISCHARGE NOTE PROVIDER - NSDCMRMEDTOKEN_GEN_ALL_CORE_FT
acetaminophen 325 mg oral tablet: 2 tab(s) orally every 6 hours  atorvastatin 20 mg oral tablet: 1 tab(s) orally once a day (at bedtime)  losartan 100 mg oral tablet: 1 tab(s) orally once a day  PARoxetine 20 mg oral tablet: 1 tab(s) orally once a day

## 2022-09-11 NOTE — PROGRESS NOTE ADULT - ASSESSMENT
POD 1 S/P laparoscopic appendectomy.  Progressing well with no issues    Possible discharge home today

## 2022-09-11 NOTE — DISCHARGE NOTE PROVIDER - NSDCCPTREATMENT_GEN_ALL_CORE_FT
PRINCIPAL PROCEDURE  Procedure: Laparoscopic appendectomy  Findings and Treatment: Follow up: Please call and make an appointment with the Acute Care Surgery Clinic 10-14 days after discharge. Also, please call and make an appointment with your primary care physician as per your usual schedule.   Activity: May return to normal activities as tolerated, however refrain from heavy lifting > 10-15 lbs.  Diet: May continue regular diet.  Medications: Please take all medications listed on your discharge paperwork as prescribed.   You are encouraged to take over-the-counter tylenol and/or ibuprofen for pain relief   Wound Care: Please, keep wound site clean and dry. You may shower, but do not bathe.  Patient is advised to RETURN TO THE EMERGENCY DEPARTMENT for any of the following - worsening pain, fever/chills, nausea/vomiting, altered mental status, chest pain, shortness of breath, or any other new / worsening symptom.

## 2022-09-11 NOTE — DISCHARGE NOTE PROVIDER - HOSPITAL COURSE
78yo F w/ PMH of HTN, HLD, and IBS who presents to ED with abdominal pain that began yesterday morning around 3AM. Described as sharp pain across upper and lower abdomen. Associated with nausea, vomiting and chills. Denies fevers, chest pain, or SOB. Follows with GI, Dr. London outpatient. Had an appointment a few months ago for colonoscopy, but had syncopal episode day prior after prep. BM have been regular, but positive stool CA screening test.  went to OR for appendectomy after diagnosis with acute appendicitis, some inflamamtory fluid, tolerated procedure well, no perforation, postoperatively pain improved tolerating diet

## 2022-09-11 NOTE — DISCHARGE NOTE NURSING/CASE MANAGEMENT/SOCIAL WORK - NSDCPEFALRISK_GEN_ALL_CORE
For information on Fall & Injury Prevention, visit: [email or web address] OR  [email or web address] OR  https://www.cdc.gov/steadi/patient.html

## 2022-09-11 NOTE — DISCHARGE NOTE PROVIDER - NSFOLLOWUPCLINICS_GEN_ALL_ED_FT
[facility] Acute Care Surgery  Acute Care Surgery  [address] [place] [id number]  Phone: [phone]  Fax:   Follow Up Time: 2 weeks

## 2022-09-11 NOTE — PROGRESS NOTE ADULT - ATTENDING COMMENTS
Agree with above assessment.  The patient was seen and examined by myself with the surgical resident.  The patient reports that she is feeling better. She has much less pain and has no nausea, or vomit.  Abdomen is soft without localizing tenderness, incision sites are clean without infection or drainage.  The patient is stable for discharge home.

## 2022-09-11 NOTE — PROGRESS NOTE ADULT - SUBJECTIVE AND OBJECTIVE BOX
INTERVAL HPI/OVERNIGHT EVENTS:    Patient evaluated at bedside. No acute distress. No acute events overnight.    MEDICATIONS  (STANDING):  acetaminophen     Tablet .. 650 milliGRAM(s) Oral every 6 hours  atorvastatin 20 milliGRAM(s) Oral at bedtime  cefoTEtan  IVPB 2 Gram(s) IV Intermittent every 12 hours  enoxaparin Injectable 40 milliGRAM(s) SubCutaneous every 24 hours  lactated ringers. 1000 milliLiter(s) (100 mL/Hr) IV Continuous <Continuous>  losartan 100 milliGRAM(s) Oral daily  PARoxetine 20 milliGRAM(s) Oral daily    MEDICATIONS  (PRN):  ketorolac   Injectable 15 milliGRAM(s) IV Push every 4 hours PRN Moderate Pain (4 - 6)  ondansetron Injectable 4 milliGRAM(s) IV Push once PRN Nausea and/or Vomiting      Vital Signs Last 24 Hrs  T(C): 36.8 (10 Sep 2022 19:10), Max: 37.4 (10 Sep 2022 07:28)  T(F): 98.2 (10 Sep 2022 19:10), Max: 99.4 (10 Sep 2022 07:28)  HR: 89 (10 Sep 2022 19:10) (68 - 94)  BP: 125/87 (10 Sep 2022 19:10) (103/46 - 162/90)  BP(mean): 67 (10 Sep 2022 16:45) (60 - 72)  RR: 18 (10 Sep 2022 19:10) (14 - 20)  SpO2: 95% (10 Sep 2022 19:10) (92% - 98%)    Parameters below as of 10 Sep 2022 19:10  Patient On (Oxygen Delivery Method): room air        Constitutional: NAD  unlabored breathing  RRR  Abdomen soft, non distended, mildly tender at port incision sites  LE: no edema    I&O's Detail    10 Sep 2022 07:01  -  11 Sep 2022 02:20  --------------------------------------------------------  IN:    Lactated Ringers: 500 mL  Total IN: 500 mL    OUT:  Total OUT: 0 mL    Total NET: 500 mL          LABS:                        14.2   9.57  )-----------( 240      ( 10 Sep 2022 06:24 )             40.9     09-10    138  |  101  |  11.1  ----------------------------<  121<H>  3.6   |  24.0  |  0.47<L>    Ca    9.1      10 Sep 2022 06:24  Phos  2.7     09-10  Mg     1.6     09-10    TPro  7.4  /  Alb  4.9  /  TBili  1.0  /  DBili  x   /  AST  39<H>  /  ALT  13  /  AlkPhos  102  09-09    PT/INR - ( 10 Sep 2022 06:24 )   PT: 15.4 sec;   INR: 1.32 ratio         PTT - ( 10 Sep 2022 06:24 )  PTT:30.8 sec  Urinalysis Basic - ( 10 Sep 2022 02:08 )    Color: Yellow / Appearance: Clear / S.010 / pH: x  Gluc: x / Ketone: Small  / Bili: Negative / Urobili: Negative mg/dL   Blood: x / Protein: 15 / Nitrite: Negative   Leuk Esterase: Moderate / RBC: 3-5 /HPF / WBC 11-25 /HPF   Sq Epi: x / Non Sq Epi: Moderate / Bacteria: Few        RADIOLOGY & ADDITIONAL STUDIES:

## 2022-09-11 NOTE — DISCHARGE NOTE NURSING/CASE MANAGEMENT/SOCIAL WORK - PATIENT PORTAL LINK FT
You can access the FollowMyHealth Patient Portal offered by [facility] by registering at the following website: [email or web address]. By joining [employer]’s FollowMyHealth portal, you will also be able to view your health information using other applications (apps) compatible with our system.

## 2022-09-13 PROBLEM — E78.5 HYPERLIPIDEMIA, UNSPECIFIED: Chronic | Status: ACTIVE | Noted: 2022-09-09

## 2022-09-13 PROBLEM — I10 ESSENTIAL (PRIMARY) HYPERTENSION: Chronic | Status: ACTIVE | Noted: 2022-09-09

## 2022-09-13 LAB — SURGICAL PATHOLOGY STUDY: SIGNIFICANT CHANGE UP

## 2022-09-20 ENCOUNTER — APPOINTMENT (OUTPATIENT)
Dept: TRAUMA SURGERY | Facility: CLINIC | Age: 77
End: 2022-09-20

## 2022-09-20 VITALS
TEMPERATURE: 97.2 F | RESPIRATION RATE: 16 BRPM | HEIGHT: 62 IN | DIASTOLIC BLOOD PRESSURE: 71 MMHG | BODY MASS INDEX: 24.84 KG/M2 | OXYGEN SATURATION: 96 % | WEIGHT: 135 LBS | SYSTOLIC BLOOD PRESSURE: 131 MMHG | HEART RATE: 78 BPM

## 2022-09-20 DIAGNOSIS — Z90.49 ACQUIRED ABSENCE OF OTHER SPECIFIED PARTS OF DIGESTIVE TRACT: ICD-10-CM

## 2022-09-20 PROCEDURE — 99024 POSTOP FOLLOW-UP VISIT: CPT

## 2022-09-23 ENCOUNTER — EMERGENCY (EMERGENCY)
Facility: HOSPITAL | Age: 77
LOS: 1 days | Discharge: HOME | End: 2022-09-23
Attending: EMERGENCY MEDICINE
Payer: MEDICARE

## 2022-09-23 VITALS
WEIGHT: 132.06 LBS | SYSTOLIC BLOOD PRESSURE: 139 MMHG | HEIGHT: 64 IN | RESPIRATION RATE: 20 BRPM | HEART RATE: 86 BPM | TEMPERATURE: 98 F | DIASTOLIC BLOOD PRESSURE: 84 MMHG | OXYGEN SATURATION: 98 %

## 2022-09-23 VITALS
HEART RATE: 80 BPM | TEMPERATURE: 98 F | OXYGEN SATURATION: 98 % | SYSTOLIC BLOOD PRESSURE: 126 MMHG | DIASTOLIC BLOOD PRESSURE: 80 MMHG | RESPIRATION RATE: 18 BRPM

## 2022-09-23 DIAGNOSIS — Z98.890 OTHER SPECIFIED POSTPROCEDURAL STATES: Chronic | ICD-10-CM

## 2022-09-23 PROBLEM — Z90.49 STATUS POST LAPAROSCOPIC APPENDECTOMY: Status: ACTIVE | Noted: 2022-09-23

## 2022-09-23 LAB
ALBUMIN SERPL ELPH-MCNC: 4.3 G/DL — SIGNIFICANT CHANGE UP (ref 3.3–5.2)
ALP SERPL-CCNC: 98 U/L — SIGNIFICANT CHANGE UP (ref 40–120)
ALT FLD-CCNC: 21 U/L — SIGNIFICANT CHANGE UP
ANION GAP SERPL CALC-SCNC: 10 MMOL/L — SIGNIFICANT CHANGE UP (ref 5–17)
APTT BLD: 30.7 SEC — SIGNIFICANT CHANGE UP (ref 27.5–35.5)
AST SERPL-CCNC: 40 U/L — HIGH
BASOPHILS # BLD AUTO: 0.03 K/UL — SIGNIFICANT CHANGE UP (ref 0–0.2)
BASOPHILS NFR BLD AUTO: 0.4 % — SIGNIFICANT CHANGE UP (ref 0–2)
BILIRUB SERPL-MCNC: 0.2 MG/DL — LOW (ref 0.4–2)
BUN SERPL-MCNC: 18.4 MG/DL — SIGNIFICANT CHANGE UP (ref 8–20)
CALCIUM SERPL-MCNC: 9.6 MG/DL — SIGNIFICANT CHANGE UP (ref 8.4–10.5)
CHLORIDE SERPL-SCNC: 98 MMOL/L — SIGNIFICANT CHANGE UP (ref 98–107)
CO2 SERPL-SCNC: 28 MMOL/L — SIGNIFICANT CHANGE UP (ref 22–29)
CREAT SERPL-MCNC: 0.49 MG/DL — LOW (ref 0.5–1.3)
D DIMER BLD IA.RAPID-MCNC: 598 NG/ML DDU — HIGH
EGFR: 97 ML/MIN/1.73M2 — SIGNIFICANT CHANGE UP
EOSINOPHIL # BLD AUTO: 0.09 K/UL — SIGNIFICANT CHANGE UP (ref 0–0.5)
EOSINOPHIL NFR BLD AUTO: 1.2 % — SIGNIFICANT CHANGE UP (ref 0–6)
GLUCOSE SERPL-MCNC: 84 MG/DL — SIGNIFICANT CHANGE UP (ref 70–99)
HCT VFR BLD CALC: 41 % — SIGNIFICANT CHANGE UP (ref 34.5–45)
HGB BLD-MCNC: 13.5 G/DL — SIGNIFICANT CHANGE UP (ref 11.5–15.5)
IMM GRANULOCYTES NFR BLD AUTO: 0.4 % — SIGNIFICANT CHANGE UP (ref 0–0.9)
INR BLD: 1.07 RATIO — SIGNIFICANT CHANGE UP (ref 0.88–1.16)
LYMPHOCYTES # BLD AUTO: 1.83 K/UL — SIGNIFICANT CHANGE UP (ref 1–3.3)
LYMPHOCYTES # BLD AUTO: 24.4 % — SIGNIFICANT CHANGE UP (ref 13–44)
MCHC RBC-ENTMCNC: 30.5 PG — SIGNIFICANT CHANGE UP (ref 27–34)
MCHC RBC-ENTMCNC: 32.9 GM/DL — SIGNIFICANT CHANGE UP (ref 32–36)
MCV RBC AUTO: 92.6 FL — SIGNIFICANT CHANGE UP (ref 80–100)
MONOCYTES # BLD AUTO: 0.57 K/UL — SIGNIFICANT CHANGE UP (ref 0–0.9)
MONOCYTES NFR BLD AUTO: 7.6 % — SIGNIFICANT CHANGE UP (ref 2–14)
NEUTROPHILS # BLD AUTO: 4.94 K/UL — SIGNIFICANT CHANGE UP (ref 1.8–7.4)
NEUTROPHILS NFR BLD AUTO: 66 % — SIGNIFICANT CHANGE UP (ref 43–77)
NT-PROBNP SERPL-SCNC: 55 PG/ML — SIGNIFICANT CHANGE UP (ref 0–300)
PLATELET # BLD AUTO: 559 K/UL — HIGH (ref 150–400)
POTASSIUM SERPL-MCNC: 4.5 MMOL/L — SIGNIFICANT CHANGE UP (ref 3.5–5.3)
POTASSIUM SERPL-SCNC: 4.5 MMOL/L — SIGNIFICANT CHANGE UP (ref 3.5–5.3)
PROT SERPL-MCNC: 7.1 G/DL — SIGNIFICANT CHANGE UP (ref 6.6–8.7)
PROTHROM AB SERPL-ACNC: 12.4 SEC — SIGNIFICANT CHANGE UP (ref 10.5–13.4)
RAPID RVP RESULT: DETECTED
RBC # BLD: 4.43 M/UL — SIGNIFICANT CHANGE UP (ref 3.8–5.2)
RBC # FLD: 13.7 % — SIGNIFICANT CHANGE UP (ref 10.3–14.5)
RV+EV RNA SPEC QL NAA+PROBE: DETECTED
SARS-COV-2 RNA SPEC QL NAA+PROBE: SIGNIFICANT CHANGE UP
SODIUM SERPL-SCNC: 135 MMOL/L — SIGNIFICANT CHANGE UP (ref 135–145)
TROPONIN T SERPL-MCNC: <0.01 NG/ML — SIGNIFICANT CHANGE UP (ref 0–0.06)
WBC # BLD: 7.49 K/UL — SIGNIFICANT CHANGE UP (ref 3.8–10.5)
WBC # FLD AUTO: 7.49 K/UL — SIGNIFICANT CHANGE UP (ref 3.8–10.5)

## 2022-09-23 PROCEDURE — 71275 CT ANGIOGRAPHY CHEST: CPT | Mod: 26,ME

## 2022-09-23 PROCEDURE — G1004: CPT

## 2022-09-23 PROCEDURE — 83880 ASSAY OF NATRIURETIC PEPTIDE: CPT

## 2022-09-23 PROCEDURE — 96374 THER/PROPH/DIAG INJ IV PUSH: CPT | Mod: XU

## 2022-09-23 PROCEDURE — 93005 ELECTROCARDIOGRAM TRACING: CPT

## 2022-09-23 PROCEDURE — 85025 COMPLETE CBC W/AUTO DIFF WBC: CPT

## 2022-09-23 PROCEDURE — 84484 ASSAY OF TROPONIN QUANT: CPT

## 2022-09-23 PROCEDURE — 99285 EMERGENCY DEPT VISIT HI MDM: CPT | Mod: 25

## 2022-09-23 PROCEDURE — 85730 THROMBOPLASTIN TIME PARTIAL: CPT

## 2022-09-23 PROCEDURE — 71045 X-RAY EXAM CHEST 1 VIEW: CPT

## 2022-09-23 PROCEDURE — 93010 ELECTROCARDIOGRAM REPORT: CPT

## 2022-09-23 PROCEDURE — 36415 COLL VENOUS BLD VENIPUNCTURE: CPT

## 2022-09-23 PROCEDURE — 0225U NFCT DS DNA&RNA 21 SARSCOV2: CPT

## 2022-09-23 PROCEDURE — 71045 X-RAY EXAM CHEST 1 VIEW: CPT | Mod: 26

## 2022-09-23 PROCEDURE — 85379 FIBRIN DEGRADATION QUANT: CPT

## 2022-09-23 PROCEDURE — 71275 CT ANGIOGRAPHY CHEST: CPT | Mod: ME

## 2022-09-23 PROCEDURE — 85610 PROTHROMBIN TIME: CPT

## 2022-09-23 PROCEDURE — 99285 EMERGENCY DEPT VISIT HI MDM: CPT | Mod: GC

## 2022-09-23 PROCEDURE — 80053 COMPREHEN METABOLIC PANEL: CPT

## 2022-09-23 RX ORDER — KETOROLAC TROMETHAMINE 30 MG/ML
15 SYRINGE (ML) INJECTION ONCE
Refills: 0 | Status: DISCONTINUED | OUTPATIENT
Start: 2022-09-23 | End: 2022-09-23

## 2022-09-23 RX ADMIN — Medication 15 MILLIGRAM(S): at 12:36

## 2022-09-23 NOTE — ED PROVIDER NOTE - ATTENDING CONTRIBUTION TO CARE
I personally saw the patient with the resident, and completed the key components of the history and physical exam. I then discussed the management plan with the resident.    76 y/o F recently s/p lap appy by Dr. Zepeda presents for chest pain, saw Dr. Zepeda in the office and sent to the ED for chest pain. No known cardiac history.    I agree with exam as documented.    EKG non-ischemic, labs, CXR clear - patient with elevated d-dimer - will obtain CT angio, if negative, likely D/C.

## 2022-09-23 NOTE — PHYSICAL EXAM
[Normal Breath Sounds] : Normal breath sounds [Normal Heart Sounds] : normal heart sounds [Abdomen Tenderness] : ~T ~M No abdominal tenderness [No Rash or Lesion] : No rash or lesion [Purpura] : no purpura  [Petechiae] : no petechiae [Skin Ulcer] : no ulcer [Skin Induration] : no induration [Alert] : alert [Oriented to Person] : oriented to person [Oriented to Place] : oriented to place [Oriented to Time] : oriented to time [Calm] : calm [de-identified] : wdwn  female  in  nad [de-identified] : non icteric sclera PERRLA EOMS intact  and  nl [de-identified] : trachea  midline   no  nuchal  rigidity [de-identified] : soft  no  guarding  no distension  non  tender to palpation   all incision sites c/d/i  no  signs  of  cellulitis  no  formation of  seroma  no  guarding  no rebound

## 2022-09-23 NOTE — ED PROVIDER NOTE - CLINICAL SUMMARY MEDICAL DECISION MAKING FREE TEXT BOX
76 y/o F hx of anxiety, recent appendectomy presents w/ chest pain for the past 1 week. endorses seeing surgeon In clinic few days ago for followup.  pleuritic chest pain w/ recent surgery, will get d-dimer for possible PE, CTA if positive. ekg/trop/cxr. pain control. abdomen is soft, non-tender, non-distended. well appearing, stable vitals. saw surgeon in office earlier this week.

## 2022-09-23 NOTE — HISTORY OF PRESENT ILLNESS
[FreeTextEntry1] : Patient presents  to ACS  clinic  for  post op exams/p  laparoscopic appendectomy   Patient a t  time  of  this  exam denies  any  fevers  no chills  no  n/v/d   nl bm nl urination  No acute  changes  patient  states occasional get pain to abdomen with a deep breath  No chest  pain  No  SOB

## 2022-09-23 NOTE — ED ADULT TRIAGE NOTE - ESI TRIAGE ACUITY LEVEL, MLM
PROCEDURE:  Ultrasound and fluoroscopically placed left upper 

extremity PICC line.



HISTORY:

Sepsis. Long-term IV antibiotics.  Needs PICC line.



PHYSICIAN(S):  Jerzy Sotelo MD.



TECHNIQUE:

The relative risks and indications of the procedure were explained to 

the patient's family and consent obtained. The patient was placed 

supine on the arteriogram table and the left arm prepped and draped 

in the usual sterile fashion. A tourniquet was applied to the left 

axilla.



1% Xylocaine was used to anesthetize the skin and soft tissues at the 

puncture site above the elbow. The left basilic vein was punctured 

under direct ultrasound guidance with a micropuncture set. A 0.018 

guidewire was advanced centrally and used to measure the length to 

the SVC/RA junction. A 5 Bangladeshi single-lumen PICC line 50 cm long was 

advanced to the SVC/RA junction. The catheter was flushed and 

secured. The patient tolerated the procedure well.



IMPRESSION:

1. Ultrasound and fluoroscopically placed left upper extremity PICC 

line. A 5 Bangladeshi single-lumen PICC line 50 cm long was advanced to 

the SVC/RA junction.
2

## 2022-09-23 NOTE — ASSESSMENT
[FreeTextEntry1] : RTC next  week  for  recheck\par F/u GI-due to age of appendectomy to rule out any other pathology\par Discussion with patient about pain - consider going to the ED - as per patient will wait to see if pain persists and will consider going back to [facility] ED- patient understands importance of f/u\par Discussion with patient   All questions answered  Any acute symptoms and or concerns patient understands  to call back office  and  or  go  directly to [facility] ED\par

## 2022-09-23 NOTE — ED ADULT NURSE NOTE - OBJECTIVE STATEMENT
Assumed care of patient in a10l. Pt a&ox4 rr even and unlabored with pmhx of htn, hld. Pt reports having an appendectomy x2 weeks ago with dr. Zepeda. Pt presents to ed with epigastric pain, belching and chest discomfort about approximately 3 days. Pt was referred to ed by md. Pt denies fevers, chills, sob, gu/gi symptoms. pt educated on plan of care, pt able to successfully teach back plan of care to RN, RN will continue to reeducate pt during hospital stay.

## 2022-09-23 NOTE — ED PROVIDER NOTE - PHYSICAL EXAMINATION
General: Well appearing female in no acute distress  HEENT: Normocephalic, atraumatic. Moist mucous membranes. Oropharynx clear. No lymphadenopathy.  Eyes: No scleral icterus. EOMI. PENNY.  Neck:. Soft and supple. Full ROM without pain. No midline tenderness  Cardiac: Regular rate and regular rhythm. No murmurs, rubs, gallops. Peripheral pulses 2+ and symmetric. No LE edema.  Resp: Lungs CTAB. Speaking in full sentences. No wheezes, rales or rhonchi.  Abd: Soft, non-tender, non-distended. No guarding or rebound. No scars, masses, or lesions.  Back: Spine midline and non-tender. No CVA tenderness.    Skin: No rashes, abrasions, or lacerations.  Neuro: AO x 3. Moves all extremities symmetrically. Motor strength and sensation grossly intact.

## 2022-09-23 NOTE — ED ADULT TRIAGE NOTE - CHIEF COMPLAINT QUOTE
" I have been having epigastric and chest pain for about 2-3 days, I called my doctor and was told to come to the ED" pt states she had appendix surgery 2 weeks ago

## 2022-09-23 NOTE — ED PROVIDER NOTE - PATIENT PORTAL LINK FT
You can access the FollowMyHealth Patient Portal offered by [facility] by registering at the following website: [email or web address]. By joining [employer]’s FollowMyHealth portal, you will also be able to view your health information using other applications (apps) compatible with our system.

## 2022-09-23 NOTE — ED PROVIDER NOTE - OBJECTIVE STATEMENT
78 y/o F hx of kidney stones, HTN, HLD, IBS presents w/ weakness acute on chronic for past 1 month. 76 y/o F hx of anxiety, recent appendectomy presents w/ chest pain for the past 1 week. endorses seeing surgeon In clinic few days ago for followup. states chest pain is b/l, worse w/ inspiration. denies worsening chest pain w/ exertion. denies abdominal pain. denies nausea/vomiting. denies fever/chills. denies recent travel.

## 2022-09-27 ENCOUNTER — APPOINTMENT (OUTPATIENT)
Dept: TRAUMA SURGERY | Facility: CLINIC | Age: 77
End: 2022-09-27

## 2022-09-27 VITALS
SYSTOLIC BLOOD PRESSURE: 135 MMHG | WEIGHT: 137 LBS | BODY MASS INDEX: 25.21 KG/M2 | OXYGEN SATURATION: 96 % | RESPIRATION RATE: 16 BRPM | TEMPERATURE: 96.3 F | DIASTOLIC BLOOD PRESSURE: 71 MMHG | HEIGHT: 62 IN | HEART RATE: 77 BPM

## 2022-09-27 DIAGNOSIS — K37 UNSPECIFIED APPENDICITIS: ICD-10-CM

## 2022-09-27 PROCEDURE — 99024 POSTOP FOLLOW-UP VISIT: CPT

## 2022-09-27 NOTE — ASSESSMENT
[FreeTextEntry1] : Chest pain had negative work up.\par From appendix stand point, all questions answered.\par Re discuss pulmonary nodules on CT scan and need for follow up CT scan, all questions answered.\par RTO PRN

## 2022-09-27 NOTE — HISTORY OF PRESENT ILLNESS
[de-identified] : Chest pain from previous visit, worked up \par no PE no ACS.\par all symptoms now resolved.\par tolerating diet

## 2022-09-27 NOTE — PHYSICAL EXAM
[de-identified] : NAD [de-identified] : no dyspnea, no pain [de-identified] : soft, non tender , well healing scars

## 2022-09-30 ENCOUNTER — APPOINTMENT (OUTPATIENT)
Dept: ORTHOPEDIC SURGERY | Facility: CLINIC | Age: 77
End: 2022-09-30

## 2022-09-30 VITALS
HEIGHT: 62 IN | DIASTOLIC BLOOD PRESSURE: 78 MMHG | BODY MASS INDEX: 25.21 KG/M2 | HEART RATE: 70 BPM | SYSTOLIC BLOOD PRESSURE: 137 MMHG | WEIGHT: 137 LBS

## 2022-09-30 PROCEDURE — 73562 X-RAY EXAM OF KNEE 3: CPT | Mod: LT

## 2022-09-30 PROCEDURE — 20610 DRAIN/INJ JOINT/BURSA W/O US: CPT | Mod: LT

## 2022-09-30 PROCEDURE — 99213 OFFICE O/P EST LOW 20 MIN: CPT | Mod: 25

## 2022-09-30 NOTE — PROCEDURE
[de-identified] : Procedure: Injection of the left knee. \par Indication:  Osteoarthritis. \par Risk and benefits were discussed with the patient. Potential complications include bleeding and infection. Verbal consent was obtained prior to the procedure. \par Chloraprep was used to prep the area. ethyl chloride spray was used as a topical anesthetic. Using sterile technique, the aspiration/injection needle was then directed from a medial aspect was used to inject 3 mL of 1% Lidocaine and 2 mL of 40mg/mL methylprednisolone. A bandage was applied. The patient tolerated the procedure well. \par Complications: none. \par Patient instructed to avoid strenuous activity for 1 day(s). \par Follow-up in the office as needed.

## 2022-09-30 NOTE — DISCUSSION/SUMMARY
[de-identified] : 25 minutes was spent reviewing the x-rays as well as discussing with the patient their clinical presentation, diagnosis and providing education.  The patient was shown the advanced osteoarthritis left knee.  She is not ready to proceed with a knee replacement.  An injection was requested.  It was performed.  She will continue acetaminophen.  She will be seen back as needed.  She may consider the use of the neoprene sleeve if necessary.  All questions were answered to the patient's satisfaction.

## 2022-09-30 NOTE — PHYSICAL EXAM
[de-identified] : The patient is conversive and in no apparent distress. The patient is alert and oriented to person, place, and time. Affect and mood appear normal. The head is normocephalic and atraumatic. Skin shows normal turgor with no evidence of eczema or psoriasis. No respiratory distress noted. Sensation grossly intact. MUSCULOSKELETAL:   SEE BELOW\par \par Left knee exam demonstrates skin is clean, dry intact.  No surgical scars.  No erythema or ecchymosis.  Minimal soft tissue swelling.  Small effusion.  Normal temperature.  -3 degree varus alignment.  There is full extension to 0 degrees.  No extensor mechanism lag.  No flexion contracture.  Passive flexion is 115 degrees.  Mild patellofemoral crepitus.  There is joint line tenderness medially.  No lateral tenderness.  Mild laxity with medial/lateral stress testing.  Mild spider veins.  No large varicose veins.  No ulcerations.  2+ DP pulse.  Normal sensation to light touch. [de-identified] : X-rays taken today of the left knee were compared to previous x-rays from 2014.  The patient has had advanced medial compartment joint space narrowing.  The patient is essentially bone-on-bone today.  Tricompartmental osteophyte formation.

## 2022-09-30 NOTE — HISTORY OF PRESENT ILLNESS
[de-identified] : Patient presents today for evaluation of left knee pain.  The patient has been seen in the past for left knee pain.  She has had an occasional cortisone injection.  She reports of 2 to 3 months worth of increased left knee pain.  Is primarily medial.  She does report of some crepitus.  It is worse when she goes up and down stairs or stands for an extended period of time.  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  She is not ready to proceed with a knee replacement.  She is not using a cane or a walker.  She is not using any bracing.  She does take Tylenol daily for pain control.  She is not using any anti-inflammatories.  No topical creams or ointments.\par \par Review of Systems-\par Constitutional: No fever or chills. \par Cardiovascular: No orthopnea or chest pain\par Pulmonary: No shortness of breath. \par GI: No nausea or vomiting or abdominal pain.\par Musculoskeletal: see HPI \par Psychiatric: No anxiety and depression.

## 2022-11-22 ENCOUNTER — APPOINTMENT (OUTPATIENT)
Age: 77
End: 2022-11-22

## 2022-11-22 ENCOUNTER — RESULT CHARGE (OUTPATIENT)
Age: 77
End: 2022-11-22

## 2022-11-22 VITALS — DIASTOLIC BLOOD PRESSURE: 75 MMHG | SYSTOLIC BLOOD PRESSURE: 171 MMHG

## 2022-11-22 DIAGNOSIS — N81.9 FEMALE GENITAL PROLAPSE, UNSPECIFIED: ICD-10-CM

## 2022-11-22 LAB
BILIRUB UR QL STRIP: NEGATIVE
CLARITY UR: CLEAR
COLLECTION METHOD: NORMAL
GLUCOSE UR-MCNC: NEGATIVE
HCG UR QL: 0.2 EU/DL
HGB UR QL STRIP.AUTO: NORMAL
KETONES UR-MCNC: NEGATIVE
LEUKOCYTE ESTERASE UR QL STRIP: NORMAL
NITRITE UR QL STRIP: NEGATIVE
PH UR STRIP: 5.5
PROT UR STRIP-MCNC: NEGATIVE
SP GR UR STRIP: 1.01

## 2022-11-22 PROCEDURE — 99213 OFFICE O/P EST LOW 20 MIN: CPT

## 2022-11-22 PROCEDURE — 81003 URINALYSIS AUTO W/O SCOPE: CPT | Mod: QW

## 2022-11-22 PROCEDURE — 51798 US URINE CAPACITY MEASURE: CPT

## 2022-11-22 NOTE — PHYSICAL EXAM
[Chaperone Present] : A chaperone was present in the examining room during all aspects of the physical examination [No Acute Distress] : in no acute distress [Well developed] : well developed [Well Nourished] : ~L well nourished [Oriented x3] : oriented to person, place, and time [Normal Memory] : ~T memory was ~L unimpaired [Normal Mood/Affect] : mood and affect are normal [Warm and Dry] : was warm and dry to touch [Normal Gait] : gait was normal [Vulvar Atrophy] : vulvar atrophy [Labia Majora] : were normal [Labia Minora] : were normal [Atrophy] : atrophy [No Bleeding] : there was no active vaginal bleeding [2] : 2 [Aa ____] : Aa [unfilled] [Ba ____] : Ba [unfilled] [C ____] : C [unfilled] [GH ____] : GH [unfilled] [PB ____] : PB [unfilled] [TVL ____] : TVL  [unfilled] [Ap ____] : Ap [unfilled] [Bp ____] : Bp [unfilled] [D ____] : D [unfilled] [Uterine Adnexae] : were not tender and not enlarged [Normal] : no abnormalities [Normal rectal exam] : was normal [Cough] : no cough [Inguinal LAD] : no adenopathy was noted in the inguinal lymph nodes

## 2022-11-22 NOTE — HISTORY OF PRESENT ILLNESS
[Cystocele (Obstetric)] : no [Uterine Prolapse] : no [Vaginal Wall Prolapse] : no [Rectal Prolapse] : mild [Unable To Restrain Bowel Movement] : no [Hematuria] : no [x1] : nocturia once nightly [Feelings Of Urinary Urgency] : no [Pain During Urination (Dysuria)] : no [Urinary Tract Infection] : mild [Constipation Obstructed Defecation] : mild [Stool Visible Blood] : mild [] : no [de-identified] : 4 [FreeTextEntry1] : \vania Vega presents for eval of POP and FI, she reports that the bulge is not bothersome to her, the FI is bothersome to her, she has not scheduled her colonoscopy yet, she did have an appendectomy since last visit, she reports that she has no urinary symptoms

## 2022-11-22 NOTE — DISCUSSION/SUMMARY
[FreeTextEntry1] : \par Gary presents for eval of POP/FI. \par No changed in POP, not doing PFE\par FI: discussed stool bulking, lomitol, PFE, discussed importance of f/u with GI\par return in 6 months or sooner, all questions answered.

## 2023-04-10 ENCOUNTER — APPOINTMENT (OUTPATIENT)
Dept: CARDIOLOGY | Facility: CLINIC | Age: 78
End: 2023-04-10

## 2023-05-09 ENCOUNTER — APPOINTMENT (OUTPATIENT)
Dept: UROGYNECOLOGY | Facility: CLINIC | Age: 78
End: 2023-05-09

## 2023-06-29 ENCOUNTER — OFFICE (OUTPATIENT)
Dept: URBAN - METROPOLITAN AREA CLINIC 104 | Facility: CLINIC | Age: 78
Setting detail: OPHTHALMOLOGY
End: 2023-06-29
Payer: MEDICARE

## 2023-06-29 ENCOUNTER — RX ONLY (RX ONLY)
Age: 78
End: 2023-06-29

## 2023-06-29 DIAGNOSIS — H25.13: ICD-10-CM

## 2023-06-29 DIAGNOSIS — H16.223: ICD-10-CM

## 2023-06-29 DIAGNOSIS — H25.12: ICD-10-CM

## 2023-06-29 DIAGNOSIS — H35.372: ICD-10-CM

## 2023-06-29 DIAGNOSIS — H43.393: ICD-10-CM

## 2023-06-29 DIAGNOSIS — H25.11: ICD-10-CM

## 2023-06-29 DIAGNOSIS — H02.831: ICD-10-CM

## 2023-06-29 DIAGNOSIS — H02.834: ICD-10-CM

## 2023-06-29 PROCEDURE — 99204 OFFICE O/P NEW MOD 45 MIN: CPT | Performed by: OPHTHALMOLOGY

## 2023-06-29 PROCEDURE — 92136 OPHTHALMIC BIOMETRY: CPT | Performed by: OPHTHALMOLOGY

## 2023-06-29 PROCEDURE — 92134 CPTRZ OPH DX IMG PST SGM RTA: CPT | Performed by: OPHTHALMOLOGY

## 2023-06-29 ASSESSMENT — SPHEQUIV_DERIVED
OS_SPHEQUIV: -3.75
OD_SPHEQUIV: -4
OS_SPHEQUIV: -4.5
OD_SPHEQUIV: -3.75

## 2023-06-29 ASSESSMENT — SUPERFICIAL PUNCTATE KERATITIS (SPK)
OS_SPK: T
OD_SPK: T

## 2023-06-29 ASSESSMENT — KERATOMETRY
OS_K1POWER_DIOPTERS: 44.18
OS_AXISANGLE_DEGREES: 116
OD_AXISANGLE2_DEGREES: 147
OD_AXISANGLE_DEGREES: 57
OD_CYLAXISANGLE_DEGREES: 147
OD_K2POWER_DIOPTERS: 44.35
OD_K1K2_AVERAGE: 44.12
OS_K1K2_AVERAGE: 44.355
OS_K2POWER_DIOPTERS: 44.53
OD_CYLPOWER_DEGREES: 0.46
OS_K2POWER_DIOPTERS: 44.53
OS_AXISANGLE_DEGREES: 026
OD_K2POWER_DIOPTERS: 44.35
OS_AXISANGLE2_DEGREES: 026
OD_K1POWER_DIOPTERS: 43.89
OD_AXISANGLE_DEGREES: 147
OS_CYLAXISANGLE_DEGREES: 026
OS_K1POWER_DIOPTERS: 44.18
OS_CYLPOWER_DEGREES: 0.35
OD_K1POWER_DIOPTERS: 43.89

## 2023-06-29 ASSESSMENT — AXIALLENGTH_DERIVED
OD_AL: 24.89
OS_AL: 25.12
OS_AL: 24.8
OD_AL: 25

## 2023-06-29 ASSESSMENT — REFRACTION_CURRENTRX
OS_CYLINDER: -1.25
OS_AXIS: 086
OS_VPRISM_DIRECTION: PROGS
OS_SPHERE: -4.25
OD_OVR_VA: 20/
OD_CYLINDER: -1.00
OD_ADD: +2.75
OD_AXIS: 088
OD_VPRISM_DIRECTION: PROGS
OS_OVR_VA: 20/
OD_SPHERE: -3.00
OS_ADD: +2.75

## 2023-06-29 ASSESSMENT — REFRACTION_AUTOREFRACTION
OD_AXIS: 099
OD_CYLINDER: -1.50
OS_SPHERE: -3.25
OD_SPHERE: -3.25
OS_CYLINDER: -1.00
OS_AXIS: 102

## 2023-06-29 ASSESSMENT — REFRACTION_MANIFEST
OD_ADD: +2.75
OS_SPHERE: -4.00
OS_AXIS: 090
OD_SPHERE: -3.00
OS_ADD: +2.75
OS_CYLINDER: -1.00
OD_CYLINDER: -1.50
OD_VA1: 20/50
OD_AXIS: 100
OS_VA1: 20/60

## 2023-06-29 ASSESSMENT — VISUAL ACUITY
OS_BCVA: 20/50
OD_BCVA: 20/60

## 2023-06-29 ASSESSMENT — LID POSITION - DERMATOCHALASIS
OS_DERMATOCHALASIS: LUL 1+
OD_DERMATOCHALASIS: RUL 1+

## 2023-06-29 ASSESSMENT — TONOMETRY
OS_IOP_MMHG: 12
OD_IOP_MMHG: 12

## 2023-06-29 ASSESSMENT — CONFRONTATIONAL VISUAL FIELD TEST (CVF)
OS_FINDINGS: FULL
OD_FINDINGS: FULL

## 2023-07-28 NOTE — END OF VISIT
Problem: Delirium, Risk for  Goal: # No symptoms of delirium  Description: Evaluate delirium symptoms under active problem when present  Outcome: Outcome Met, Continue evaluating goal progress toward completion     Problem: At Risk for Falls  Goal: # Patient does not fall  Outcome: Outcome Met, Continue evaluating goal progress toward completion     Problem: Cardiac Surgery  Goal: Hemodynamic stability achieved/maintained  Description: Hemodynamic instability may include VS or rhythm changes or s/s FVE.  AHA guidelines: Keep BP>90 mm Hg.  Outcome: Outcome Met, Continue evaluating goal progress toward completion     Problem: Pressure Injury, Risk for  Goal: # Skin remains intact  Outcome: Outcome Met, Continue evaluating goal progress toward completion     Problem: Pain  Goal: #Acceptable pain level achieved/maintained at rest using NRS/Faces  Description: This goal is used for patients who can self-report.  Acceptable means the level is at or below the identified comfort/function goal.  Outcome: Outcome Met, Continue evaluating goal progress toward completion      [Time Spent: ___ minutes] : I have spent [unfilled] minutes of time on the encounter.

## 2023-08-10 ENCOUNTER — ASC (OUTPATIENT)
Dept: URBAN - METROPOLITAN AREA SURGERY 8 | Facility: SURGERY | Age: 78
Setting detail: OPHTHALMOLOGY
End: 2023-08-10
Payer: MEDICARE

## 2023-08-10 ENCOUNTER — RX ONLY (RX ONLY)
Age: 78
End: 2023-08-10

## 2023-08-10 DIAGNOSIS — H25.12: ICD-10-CM

## 2023-08-10 PROCEDURE — 68841 INSJ RX ELUT IMPLT LAC CANAL: CPT | Performed by: OPHTHALMOLOGY

## 2023-08-10 PROCEDURE — 66984 XCAPSL CTRC RMVL W/O ECP: CPT | Performed by: OPHTHALMOLOGY

## 2023-08-11 ENCOUNTER — OFFICE (OUTPATIENT)
Dept: URBAN - METROPOLITAN AREA CLINIC 104 | Facility: CLINIC | Age: 78
Setting detail: OPHTHALMOLOGY
End: 2023-08-11
Payer: MEDICARE

## 2023-08-11 DIAGNOSIS — H25.11: ICD-10-CM

## 2023-08-11 DIAGNOSIS — Z96.1: ICD-10-CM

## 2023-08-11 PROCEDURE — 99024 POSTOP FOLLOW-UP VISIT: CPT | Performed by: OPHTHALMOLOGY

## 2023-08-11 ASSESSMENT — AXIALLENGTH_DERIVED
OD_AL: 24.89
OD_AL: 25
OS_AL: 24.8
OS_AL: 25.12

## 2023-08-11 ASSESSMENT — REFRACTION_CURRENTRX
OD_VPRISM_DIRECTION: PROGS
OS_AXIS: 086
OD_SPHERE: -3.00
OD_OVR_VA: 20/
OD_ADD: +2.75
OS_VPRISM_DIRECTION: PROGS
OS_ADD: +2.75
OD_AXIS: 088
OS_OVR_VA: 20/
OS_CYLINDER: -1.25
OS_SPHERE: -4.25
OD_CYLINDER: -1.00

## 2023-08-11 ASSESSMENT — KERATOMETRY
OS_K2POWER_DIOPTERS: 44.53
OD_K1POWER_DIOPTERS: 43.89
OD_AXISANGLE_DEGREES: 147
OD_K2POWER_DIOPTERS: 44.35
OS_K1POWER_DIOPTERS: 44.18
OS_AXISANGLE_DEGREES: 026

## 2023-08-11 ASSESSMENT — CONFRONTATIONAL VISUAL FIELD TEST (CVF)
OD_FINDINGS: FULL
OS_FINDINGS: FULL

## 2023-08-11 ASSESSMENT — REFRACTION_MANIFEST
OS_ADD: +2.75
OD_SPHERE: -3.00
OS_SPHERE: -4.00
OD_CYLINDER: -1.50
OS_AXIS: 090
OS_CYLINDER: -1.00
OD_VA1: 20/50
OD_AXIS: 100
OS_VA1: 20/60
OD_ADD: +2.75

## 2023-08-11 ASSESSMENT — REFRACTION_AUTOREFRACTION
OD_AXIS: 099
OD_SPHERE: -3.25
OD_CYLINDER: -1.50
OS_AXIS: 102
OS_CYLINDER: -1.00
OS_SPHERE: -3.25

## 2023-08-11 ASSESSMENT — SPHEQUIV_DERIVED
OD_SPHEQUIV: -4
OD_SPHEQUIV: -3.75
OS_SPHEQUIV: -3.75
OS_SPHEQUIV: -4.5

## 2023-08-11 ASSESSMENT — LID POSITION - DERMATOCHALASIS
OD_DERMATOCHALASIS: RUL 1+
OS_DERMATOCHALASIS: LUL 1+

## 2023-08-11 ASSESSMENT — SUPERFICIAL PUNCTATE KERATITIS (SPK)
OS_SPK: T
OD_SPK: T

## 2023-08-11 ASSESSMENT — VISUAL ACUITY
OD_BCVA: 20/100
OS_BCVA: 20/50

## 2023-08-11 ASSESSMENT — CORNEAL EDEMA - FOLDS/STRIAE: OS_FOLDSSTRIAE: 2+ 3+

## 2023-08-11 ASSESSMENT — TONOMETRY: OS_IOP_MMHG: 11

## 2023-08-11 ASSESSMENT — CORNEAL EDEMA - MICROCYSTIC EPITHELIAL EDEMA (MCE): OS_MCE: 1+

## 2023-08-14 ENCOUNTER — OFFICE (OUTPATIENT)
Dept: URBAN - METROPOLITAN AREA CLINIC 104 | Facility: CLINIC | Age: 78
Setting detail: OPHTHALMOLOGY
End: 2023-08-14
Payer: MEDICARE

## 2023-08-14 DIAGNOSIS — Z96.1: ICD-10-CM

## 2023-08-14 DIAGNOSIS — H25.11: ICD-10-CM

## 2023-08-14 PROCEDURE — 99024 POSTOP FOLLOW-UP VISIT: CPT | Performed by: OPHTHALMOLOGY

## 2023-08-14 ASSESSMENT — VISUAL ACUITY
OS_BCVA: 20/50
OD_BCVA: 20/30

## 2023-08-14 ASSESSMENT — SUPERFICIAL PUNCTATE KERATITIS (SPK)
OS_SPK: T
OD_SPK: T

## 2023-08-14 ASSESSMENT — KERATOMETRY
OD_K1POWER_DIOPTERS: 43.89
OD_K2POWER_DIOPTERS: 44.41
OD_AXISANGLE_DEGREES: 152

## 2023-08-14 ASSESSMENT — CORNEAL EDEMA - MICROCYSTIC EPITHELIAL EDEMA (MCE): OS_MCE: ABSENT

## 2023-08-14 ASSESSMENT — AXIALLENGTH_DERIVED: OD_AL: 25.04

## 2023-08-14 ASSESSMENT — REFRACTION_AUTOREFRACTION
OD_AXIS: 91
OS_CYLINDER: -0.50
OS_SPHERE: 0.00
OD_CYLINDER: -1.75
OS_AXIS: 90
OD_SPHERE: -3.25

## 2023-08-14 ASSESSMENT — REFRACTION_CURRENTRX
OS_OVR_VA: 20/
OD_OVR_VA: 20/

## 2023-08-14 ASSESSMENT — SPHEQUIV_DERIVED
OD_SPHEQUIV: -4.125
OS_SPHEQUIV: -0.25

## 2023-08-14 ASSESSMENT — LID POSITION - DERMATOCHALASIS
OS_DERMATOCHALASIS: LUL 1+
OD_DERMATOCHALASIS: RUL 1+

## 2023-08-14 ASSESSMENT — CORNEAL EDEMA - FOLDS/STRIAE: OS_FOLDSSTRIAE: T

## 2023-08-14 ASSESSMENT — TONOMETRY: OS_IOP_MMHG: 14

## 2023-08-17 ENCOUNTER — RX ONLY (RX ONLY)
Age: 78
End: 2023-08-17

## 2023-08-17 ENCOUNTER — ASC (OUTPATIENT)
Dept: URBAN - METROPOLITAN AREA SURGERY 8 | Facility: SURGERY | Age: 78
Setting detail: OPHTHALMOLOGY
End: 2023-08-17
Payer: MEDICARE

## 2023-08-17 DIAGNOSIS — H25.11: ICD-10-CM

## 2023-08-17 PROCEDURE — 66984 XCAPSL CTRC RMVL W/O ECP: CPT | Performed by: OPHTHALMOLOGY

## 2023-08-17 PROCEDURE — 68841 INSJ RX ELUT IMPLT LAC CANAL: CPT | Performed by: OPHTHALMOLOGY

## 2023-08-18 ENCOUNTER — OFFICE (OUTPATIENT)
Dept: URBAN - METROPOLITAN AREA CLINIC 104 | Facility: CLINIC | Age: 78
Setting detail: OPHTHALMOLOGY
End: 2023-08-18
Payer: MEDICARE

## 2023-08-18 DIAGNOSIS — Z96.1: ICD-10-CM

## 2023-08-18 PROBLEM — H25.11 CATARACT SENILE NUCLEAR SCLEROSIS; RIGHT EYE,: Status: RESOLVED | Noted: 2023-08-11 | Resolved: 2023-08-18

## 2023-08-18 PROCEDURE — 99024 POSTOP FOLLOW-UP VISIT: CPT | Performed by: OPHTHALMOLOGY

## 2023-08-18 ASSESSMENT — REFRACTION_AUTOREFRACTION
OD_SPHERE: -3.25
OD_AXIS: 91
OS_AXIS: 90
OS_SPHERE: 0.00
OS_CYLINDER: -0.50
OD_CYLINDER: -1.75

## 2023-08-18 ASSESSMENT — KERATOMETRY
OD_AXISANGLE_DEGREES: 152
OD_K1POWER_DIOPTERS: 43.89
OD_K2POWER_DIOPTERS: 44.41

## 2023-08-18 ASSESSMENT — VISUAL ACUITY
OS_BCVA: 20/30
OD_BCVA: 20/25

## 2023-08-18 ASSESSMENT — SUPERFICIAL PUNCTATE KERATITIS (SPK)
OD_SPK: 1+
OS_SPK: 1+

## 2023-08-18 ASSESSMENT — TONOMETRY: OD_IOP_MMHG: 14

## 2023-08-18 ASSESSMENT — LID POSITION - DERMATOCHALASIS
OS_DERMATOCHALASIS: LUL 1+
OD_DERMATOCHALASIS: RUL 1+

## 2023-08-18 ASSESSMENT — CORNEAL EDEMA - FOLDS/STRIAE
OD_FOLDSSTRIAE: 2+
OS_FOLDSSTRIAE: ABSENT

## 2023-08-18 ASSESSMENT — SPHEQUIV_DERIVED
OS_SPHEQUIV: -0.25
OD_SPHEQUIV: -4.125

## 2023-08-18 ASSESSMENT — AXIALLENGTH_DERIVED: OD_AL: 25.04

## 2023-08-18 ASSESSMENT — CORNEAL EDEMA - MICROCYSTIC EPITHELIAL EDEMA (MCE): OS_MCE: ABSENT

## 2023-08-18 ASSESSMENT — CONFRONTATIONAL VISUAL FIELD TEST (CVF)
OS_FINDINGS: FULL
OD_FINDINGS: FULL

## 2023-08-25 ENCOUNTER — OFFICE (OUTPATIENT)
Dept: URBAN - METROPOLITAN AREA CLINIC 104 | Facility: CLINIC | Age: 78
Setting detail: OPHTHALMOLOGY
End: 2023-08-25

## 2023-08-25 DIAGNOSIS — Z96.1: ICD-10-CM

## 2023-08-25 PROCEDURE — 99024 POSTOP FOLLOW-UP VISIT: CPT | Performed by: OPHTHALMOLOGY

## 2023-08-25 ASSESSMENT — KERATOMETRY
OD_AXISANGLE_DEGREES: 144
OD_K2POWER_DIOPTERS: 44.47
OD_K1POWER_DIOPTERS: 43.95
OS_K1POWER_DIOPTERS: 44.35
OS_K2POWER_DIOPTERS: 44.58
OS_AXISANGLE_DEGREES: 046

## 2023-08-25 ASSESSMENT — REFRACTION_AUTOREFRACTION
OD_CYLINDER: -0.75
OS_SPHERE: -0.25
OD_SPHERE: PLANO
OS_AXIS: 084
OD_AXIS: 072
OS_CYLINDER: -0.25

## 2023-08-25 ASSESSMENT — TONOMETRY
OS_IOP_MMHG: 14
OD_IOP_MMHG: 14

## 2023-08-25 ASSESSMENT — CORNEAL EDEMA - FOLDS/STRIAE
OS_FOLDSSTRIAE: ABSENT
OD_FOLDSSTRIAE: 2+

## 2023-08-25 ASSESSMENT — SUPERFICIAL PUNCTATE KERATITIS (SPK)
OS_SPK: 1+
OD_SPK: 1+

## 2023-08-25 ASSESSMENT — CONFRONTATIONAL VISUAL FIELD TEST (CVF)
OS_FINDINGS: FULL
OD_FINDINGS: FULL

## 2023-08-25 ASSESSMENT — SPHEQUIV_DERIVED: OS_SPHEQUIV: -0.375

## 2023-08-25 ASSESSMENT — VISUAL ACUITY
OD_BCVA: 20/25
OS_BCVA: 20/25-2

## 2023-08-25 ASSESSMENT — AXIALLENGTH_DERIVED: OS_AL: 23.3854

## 2023-08-25 ASSESSMENT — LID POSITION - DERMATOCHALASIS
OD_DERMATOCHALASIS: RUL 1+
OS_DERMATOCHALASIS: LUL 1+

## 2023-08-25 ASSESSMENT — CORNEAL EDEMA - MICROCYSTIC EPITHELIAL EDEMA (MCE): OS_MCE: ABSENT

## 2023-09-06 ENCOUNTER — APPOINTMENT (OUTPATIENT)
Dept: GASTROENTEROLOGY | Facility: CLINIC | Age: 78
End: 2023-09-06
Payer: MEDICARE

## 2023-09-06 VITALS
WEIGHT: 138 LBS | BODY MASS INDEX: 25.4 KG/M2 | HEART RATE: 78 BPM | RESPIRATION RATE: 14 BRPM | OXYGEN SATURATION: 98 % | SYSTOLIC BLOOD PRESSURE: 130 MMHG | HEIGHT: 62 IN | DIASTOLIC BLOOD PRESSURE: 68 MMHG

## 2023-09-06 DIAGNOSIS — Z82.61 FAMILY HISTORY OF ARTHRITIS: ICD-10-CM

## 2023-09-06 DIAGNOSIS — Z78.9 OTHER SPECIFIED HEALTH STATUS: ICD-10-CM

## 2023-09-06 DIAGNOSIS — E78.00 PURE HYPERCHOLESTEROLEMIA, UNSPECIFIED: ICD-10-CM

## 2023-09-06 DIAGNOSIS — M51.36 OTHER INTERVERTEBRAL DISC DEGENERATION, LUMBAR REGION: ICD-10-CM

## 2023-09-06 DIAGNOSIS — Z86.79 PERSONAL HISTORY OF OTHER DISEASES OF THE CIRCULATORY SYSTEM: ICD-10-CM

## 2023-09-06 DIAGNOSIS — Z82.3 FAMILY HISTORY OF STROKE: ICD-10-CM

## 2023-09-06 PROCEDURE — 99214 OFFICE O/P EST MOD 30 MIN: CPT

## 2023-09-06 RX ORDER — POLYETHYLENE GLYCOL-3350 AND ELECTROLYTES WITH FLAVOR PACK 240; 5.84; 2.98; 6.72; 22.72 G/278.26G; G/278.26G; G/278.26G; G/278.26G; G/278.26G
240 POWDER, FOR SOLUTION ORAL
Qty: 1 | Refills: 0 | Status: ACTIVE | COMMUNITY
Start: 2023-09-06 | End: 1900-01-01

## 2023-09-06 NOTE — CONSULT LETTER
[Dear  ___] : Dear  [unfilled], [Consult Letter:] : I had the pleasure of evaluating your patient, [unfilled]. [Please see my note below.] : Please see my note below. [Consult Closing:] : Thank you very much for allowing me to participate in the care of this patient.  If you have any questions, please do not hesitate to contact me. [Sincerely,] : Sincerely, [FreeTextEntry1] : Positive Cologuard test from 1/22.  Recurrent epigastric abdominal pain and postprandial state.  Possible PUD versus gallbladder disease.  EGD/colonoscopy to be arranged.  Abd Sonogram requested.  No clearances required.  New blood work requested.  Arrangements made.  Results to follow. [FreeTextEntry3] : Ralph Martínez MD FACG Diplomate [employer] Board of Internal Medicine and Gastroenterolgy [facility]

## 2023-09-06 NOTE — PHYSICAL EXAM
[Alert] : alert [Normal Voice/Communication] : normal voice/communication [Healthy Appearing] : healthy appearing [No Acute Distress] : no acute distress [Sclera] : the sclera and conjunctiva were normal [Hearing Threshold Finger Rub Not Bosque] : hearing was normal [Normal Lips/Gums] : the lips and gums were normal [Oropharynx] : the oropharynx was normal [Normal Appearance] : the appearance of the neck was normal [No Neck Mass] : no neck mass was observed [No Respiratory Distress] : no respiratory distress [No Acc Muscle Use] : no accessory muscle use [Respiration, Rhythm And Depth] : normal respiratory rhythm and effort [Auscultation Breath Sounds / Voice Sounds] : lungs were clear to auscultation bilaterally [Heart Rate And Rhythm] : heart rate was normal and rhythm regular [Normal S1, S2] : normal S1 and S2 [Murmurs] : no murmurs [Bowel Sounds] : normal bowel sounds [Abdomen Tenderness] : non-tender [No Masses] : no abdominal mass palpated [Abdomen Soft] : soft [] : no hepatosplenomegaly [Oriented To Time, Place, And Person] : oriented to person, place, and time [de-identified] : Medium frame and build; in no distress. [de-identified] : Appendectomy scar.  Herniorrhaphy scar.  Normal bowel sounds.  No mass tenderness or rebound.  No organomegaly. [de-identified] : Deferred to procedure. [de-identified] : Normal. [de-identified] : Normal. [de-identified] : Normal.

## 2023-09-06 NOTE — HISTORY OF PRESENT ILLNESS
[de-identified] : No prior. [FreeTextEntry1] : 2013 by Jax Augustin MD showing sigmoid colon diverticulosis and internal hemorrhoids.  No neoplasia.

## 2023-09-06 NOTE — REASON FOR VISIT
[Follow-up] : a follow-up of an existing diagnosis [FreeTextEntry1] : Positive Cologuard test, epigastric abdominal pain postprandial

## 2023-09-06 NOTE — ASSESSMENT
[FreeTextEntry1] : Positive Cologuard from 1/22 never addressed.  Patient is at increased risk for harboring colorectal neoplasia, either polyps or cancer.  Therefore a diagnostic colonoscopy is indicated.  The procedure, its risk benefits and prep, were explained to the patient, who understands and is agreeable to proceed.  ASA #2.  Patient appears to be in optimal medical condition to undergo planned procedure.  No clearances required.  Gavel light prep will be utilized on preparation day.  Blood work has been requested prior to the procedure. Upper abdominal pain of unclear etiology in postprandial state.  Possible gallbladder disease.  Possible peptic ulcer disease.  Positive family history for 1 first-degree relative, father with peptic ulcer disease increases her risk for same.  Plan sonogram abdomen, upper endoscopy with biopsies to evaluate for H. pylori.  The procedure, its risks benefits and prep, were explained to the patient, who understands and is agreeable to proceed.  Both procedures will be scheduled simultaneously in the near future at [facility] center.  No clearances required. ASA #2.  Mallampati #2.  Patient appears to be in optimal medical condition to undergo planned procedures.  Arrangements made.  Results to follow.

## 2023-09-13 ENCOUNTER — OUTPATIENT (OUTPATIENT)
Dept: OUTPATIENT SERVICES | Facility: HOSPITAL | Age: 78
LOS: 1 days | End: 2023-09-13
Payer: MEDICARE

## 2023-09-13 ENCOUNTER — APPOINTMENT (OUTPATIENT)
Dept: ULTRASOUND IMAGING | Facility: CLINIC | Age: 78
End: 2023-09-13
Payer: MEDICARE

## 2023-09-13 DIAGNOSIS — R10.13 EPIGASTRIC PAIN: ICD-10-CM

## 2023-09-13 PROCEDURE — 76700 US EXAM ABDOM COMPLETE: CPT | Mod: 26

## 2023-09-13 PROCEDURE — 76700 US EXAM ABDOM COMPLETE: CPT

## 2023-09-22 ENCOUNTER — OFFICE (OUTPATIENT)
Dept: URBAN - METROPOLITAN AREA CLINIC 104 | Facility: CLINIC | Age: 78
Setting detail: OPHTHALMOLOGY
End: 2023-09-22
Payer: MEDICARE

## 2023-09-22 DIAGNOSIS — Z96.1: ICD-10-CM

## 2023-09-22 PROBLEM — H26.493 POSTERIOR CAPSULAR OPACIFICATION; BOTH EYES: Status: ACTIVE | Noted: 2023-09-22

## 2023-09-22 PROCEDURE — 99024 POSTOP FOLLOW-UP VISIT: CPT | Performed by: OPHTHALMOLOGY

## 2023-09-22 ASSESSMENT — REFRACTION_MANIFEST
OD_AXIS: 55
OS_ADD: +2.75
OD_SPHERE: PLANO
OS_CYLINDER: -0.50
OD_ADD: +2.75
OS_SPHERE: PLANO
OD_VA1: 20/20
OD_VA2: 20/20(J1+)
OS_VA2: 20/20(J1+)
OD_CYLINDER: -0.50
OS_VA1: 20/20
OS_AXIS: 090

## 2023-09-22 ASSESSMENT — VISUAL ACUITY
OS_BCVA: 20/25
OD_BCVA: 20/25-2

## 2023-09-22 ASSESSMENT — TONOMETRY
OD_IOP_MMHG: 11
OS_IOP_MMHG: 11

## 2023-09-22 ASSESSMENT — CORNEAL EDEMA - FOLDS/STRIAE
OS_FOLDSSTRIAE: ABSENT
OD_FOLDSSTRIAE: 2+

## 2023-09-22 ASSESSMENT — KERATOMETRY
OD_AXISANGLE_DEGREES: 144
OD_K2POWER_DIOPTERS: 44.47
OS_AXISANGLE_DEGREES: 046
OD_K1POWER_DIOPTERS: 43.95
OS_K2POWER_DIOPTERS: 44.58
OS_K1POWER_DIOPTERS: 44.35

## 2023-09-22 ASSESSMENT — REFRACTION_AUTOREFRACTION
OD_SPHERE: PLANO
OD_CYLINDER: -0.50
OS_CYLINDER: -0.75
OD_AXIS: 056
OS_AXIS: 087
OS_SPHERE: PL

## 2023-09-22 ASSESSMENT — SUPERFICIAL PUNCTATE KERATITIS (SPK)
OS_SPK: 1+
OD_SPK: 1+

## 2023-09-22 ASSESSMENT — CONFRONTATIONAL VISUAL FIELD TEST (CVF)
OD_FINDINGS: FULL
OS_FINDINGS: FULL

## 2023-09-22 ASSESSMENT — LID POSITION - DERMATOCHALASIS
OS_DERMATOCHALASIS: LUL 1+
OD_DERMATOCHALASIS: RUL 1+

## 2023-09-22 ASSESSMENT — CORNEAL EDEMA - MICROCYSTIC EPITHELIAL EDEMA (MCE): OS_MCE: ABSENT

## 2023-10-29 LAB
ALBUMIN SERPL ELPH-MCNC: 4.5 G/DL
ALP BLD-CCNC: 102 U/L
ALT SERPL-CCNC: 14 U/L
ANION GAP SERPL CALC-SCNC: 11 MMOL/L
AST SERPL-CCNC: 34 U/L
BILIRUB SERPL-MCNC: 0.5 MG/DL
BUN SERPL-MCNC: 16 MG/DL
CALCIUM SERPL-MCNC: 9.9 MG/DL
CHLORIDE SERPL-SCNC: 105 MMOL/L
CO2 SERPL-SCNC: 27 MMOL/L
CREAT SERPL-MCNC: 0.59 MG/DL
EGFR: 92 ML/MIN/1.73M2
GLUCOSE SERPL-MCNC: 99 MG/DL
HCT VFR BLD CALC: 42.9 %
HGB BLD-MCNC: 13.6 G/DL
INR PPP: 0.95 RATIO
MCHC RBC-ENTMCNC: 31.1 PG
MCHC RBC-ENTMCNC: 31.7 GM/DL
MCV RBC AUTO: 98.2 FL
PLATELET # BLD AUTO: 291 K/UL
POTASSIUM SERPL-SCNC: 4.7 MMOL/L
PROT SERPL-MCNC: 6.8 G/DL
PT BLD: 10.8 SEC
RBC # BLD: 4.37 M/UL
RBC # FLD: 14.1 %
SODIUM SERPL-SCNC: 143 MMOL/L
WBC # FLD AUTO: 5.49 K/UL

## 2023-11-28 ENCOUNTER — TRANSCRIPTION ENCOUNTER (OUTPATIENT)
Age: 78
End: 2023-11-28

## 2023-11-29 ENCOUNTER — RESULT REVIEW (OUTPATIENT)
Age: 78
End: 2023-11-29

## 2023-11-29 ENCOUNTER — OUTPATIENT (OUTPATIENT)
Dept: OUTPATIENT SERVICES | Facility: HOSPITAL | Age: 78
LOS: 1 days | End: 2023-11-29
Payer: MEDICARE

## 2023-11-29 ENCOUNTER — APPOINTMENT (OUTPATIENT)
Dept: GASTROENTEROLOGY | Facility: GI CENTER | Age: 78
End: 2023-11-29
Payer: MEDICARE

## 2023-11-29 DIAGNOSIS — F32.A DEPRESSION, UNSPECIFIED: ICD-10-CM

## 2023-11-29 DIAGNOSIS — I10 ESSENTIAL (PRIMARY) HYPERTENSION: ICD-10-CM

## 2023-11-29 DIAGNOSIS — R10.13 EPIGASTRIC PAIN: ICD-10-CM

## 2023-11-29 DIAGNOSIS — Z98.890 OTHER SPECIFIED POSTPROCEDURAL STATES: Chronic | ICD-10-CM

## 2023-11-29 DIAGNOSIS — R15.9 FULL INCONTINENCE OF FECES: ICD-10-CM

## 2023-11-29 DIAGNOSIS — K57.30 DIVERTICULOSIS OF LARGE INTESTINE W/OUT PERFORATION OR ABSCESS W/OUT BLEEDING: ICD-10-CM

## 2023-11-29 DIAGNOSIS — R19.5 OTHER FECAL ABNORMALITIES: ICD-10-CM

## 2023-11-29 DIAGNOSIS — K58.9 IRRITABLE BOWEL SYNDROME W/OUT DIARRHEA: ICD-10-CM

## 2023-11-29 DIAGNOSIS — K64.8 OTHER HEMORRHOIDS: ICD-10-CM

## 2023-11-29 PROCEDURE — 88342 IMHCHEM/IMCYTCHM 1ST ANTB: CPT | Mod: 26

## 2023-11-29 PROCEDURE — 43239 EGD BIOPSY SINGLE/MULTIPLE: CPT

## 2023-11-29 PROCEDURE — 88305 TISSUE EXAM BY PATHOLOGIST: CPT

## 2023-11-29 PROCEDURE — 88305 TISSUE EXAM BY PATHOLOGIST: CPT | Mod: 26

## 2023-11-29 PROCEDURE — 88342 IMHCHEM/IMCYTCHM 1ST ANTB: CPT

## 2023-12-06 ENCOUNTER — TRANSCRIPTION ENCOUNTER (OUTPATIENT)
Age: 78
End: 2023-12-06

## 2023-12-07 ENCOUNTER — APPOINTMENT (OUTPATIENT)
Dept: GASTROENTEROLOGY | Facility: GI CENTER | Age: 78
End: 2023-12-07
Payer: MEDICARE

## 2023-12-07 ENCOUNTER — OUTPATIENT (OUTPATIENT)
Dept: OUTPATIENT SERVICES | Facility: HOSPITAL | Age: 78
LOS: 1 days | End: 2023-12-07
Payer: MEDICARE

## 2023-12-07 ENCOUNTER — RESULT REVIEW (OUTPATIENT)
Age: 78
End: 2023-12-07

## 2023-12-07 DIAGNOSIS — Z98.890 OTHER SPECIFIED POSTPROCEDURAL STATES: Chronic | ICD-10-CM

## 2023-12-07 DIAGNOSIS — R19.5 OTHER FECAL ABNORMALITIES: ICD-10-CM

## 2023-12-07 DIAGNOSIS — Z83.79 FAMILY HISTORY OF OTHER DISEASES OF THE DIGESTIVE SYSTEM: ICD-10-CM

## 2023-12-07 PROCEDURE — 45385 COLONOSCOPY W/LESION REMOVAL: CPT | Mod: PT

## 2023-12-07 PROCEDURE — 45380 COLONOSCOPY AND BIOPSY: CPT | Mod: 59,PT

## 2023-12-07 PROCEDURE — 88305 TISSUE EXAM BY PATHOLOGIST: CPT | Mod: 26

## 2023-12-07 PROCEDURE — 45380 COLONOSCOPY AND BIOPSY: CPT | Mod: XS

## 2023-12-07 PROCEDURE — 45385 COLONOSCOPY W/LESION REMOVAL: CPT

## 2023-12-07 PROCEDURE — 88305 TISSUE EXAM BY PATHOLOGIST: CPT

## 2023-12-08 LAB
SURGICAL PATHOLOGY STUDY: SIGNIFICANT CHANGE UP
SURGICAL PATHOLOGY STUDY: SIGNIFICANT CHANGE UP

## 2023-12-15 LAB
SURGICAL PATHOLOGY STUDY: SIGNIFICANT CHANGE UP
SURGICAL PATHOLOGY STUDY: SIGNIFICANT CHANGE UP

## 2024-04-19 ENCOUNTER — APPOINTMENT (OUTPATIENT)
Dept: CT IMAGING | Facility: CLINIC | Age: 79
End: 2024-04-19
Payer: MEDICARE

## 2024-04-19 ENCOUNTER — OUTPATIENT (OUTPATIENT)
Dept: OUTPATIENT SERVICES | Facility: HOSPITAL | Age: 79
LOS: 1 days | End: 2024-04-19
Payer: MEDICARE

## 2024-04-19 DIAGNOSIS — Z00.00 ENCOUNTER FOR GENERAL ADULT MEDICAL EXAMINATION WITHOUT ABNORMAL FINDINGS: ICD-10-CM

## 2024-04-19 DIAGNOSIS — Z98.890 OTHER SPECIFIED POSTPROCEDURAL STATES: Chronic | ICD-10-CM

## 2024-04-19 PROCEDURE — 70486 CT MAXILLOFACIAL W/O DYE: CPT | Mod: MH

## 2024-04-19 PROCEDURE — 70486 CT MAXILLOFACIAL W/O DYE: CPT | Mod: 26,MH

## 2024-05-09 ENCOUNTER — APPOINTMENT (OUTPATIENT)
Dept: ORTHOPEDIC SURGERY | Facility: CLINIC | Age: 79
End: 2024-05-09
Payer: MEDICARE

## 2024-05-09 VITALS — DIASTOLIC BLOOD PRESSURE: 65 MMHG | SYSTOLIC BLOOD PRESSURE: 104 MMHG | HEART RATE: 73 BPM

## 2024-05-09 DIAGNOSIS — M17.0 BILATERAL PRIMARY OSTEOARTHRITIS OF KNEE: ICD-10-CM

## 2024-05-09 PROCEDURE — 99214 OFFICE O/P EST MOD 30 MIN: CPT | Mod: 25

## 2024-05-09 PROCEDURE — 20610 DRAIN/INJ JOINT/BURSA W/O US: CPT | Mod: RT

## 2024-05-09 PROCEDURE — 73562 X-RAY EXAM OF KNEE 3: CPT | Mod: 50

## 2024-05-09 RX ADMIN — LIDOCAINE HYDROCHLORIDE 3 %: 10 INJECTION, SOLUTION EPIDURAL; INFILTRATION; INTRACAUDAL; PERINEURAL at 00:00

## 2024-05-09 RX ADMIN — METHYLPREDNISOLONE ACETATE 2 MG/ML: 40 INJECTION, SUSPENSION INTRA-ARTICULAR; INTRALESIONAL; INTRAMUSCULAR; SOFT TISSUE at 00:00

## 2024-05-10 RX ORDER — LIDOCAINE HYDROCHLORIDE 10 MG/ML
1 INJECTION, SOLUTION INFILTRATION; PERINEURAL
Refills: 0 | Status: COMPLETED | OUTPATIENT
Start: 2024-05-09

## 2024-05-10 RX ORDER — METHYLPRED ACET/NACL,ISO-OS/PF 40 MG/ML
40 VIAL (ML) INJECTION
Refills: 0 | Status: COMPLETED | OUTPATIENT
Start: 2024-05-09

## 2024-05-12 PROBLEM — M17.0 PRIMARY OSTEOARTHRITIS OF KNEES, BILATERAL: Status: ACTIVE | Noted: 2021-08-27

## 2024-05-12 NOTE — DISCUSSION/SUMMARY
[de-identified] : After a thorough history, full evaluation and review of x-rays.  It was explained to the patient that there is osteoarthritic changes in the right knee.  The patient was explained the different modalities of treatment which include conservative versus surgical intervention.  The patient was offered an injection here in the office today for the right knee.  Prior to the injection. The patient was explained the risks, benefits, pros and cons, as well as alternatives.  It was explained that there is no guarantee for relief following the injection; and that this is not a cure for the osteoarthritis.  It was also explained to the patient that if there is any relief, this may be short-lived, as the pain may return.  This was explained in great detail in which the patient fully understood and agreed to the injection.  Therefore, it was given under sterile conditions without any complications.    The patient was also advised to continue with conservative management.  This includes a good exercise program consisting of isometric,range of motion and strengthening exercises. It is also advised to continue with ice packs, moist heat and pain medications in the form of anti-inflammatories.  The patient is advised to continue with these conservative measures and to return back to the office in another month or 2 for reevaluation and management.  However, if there is any increase in pain, redness, swelling, heat, discharge or fever.  The patient is advised to notify the office immediately.  35 minutes were spent, face to face, in direct consultation with the patient. This includes reviewing the natural history of their Dx., eliciting the history, performing an orthopedic exam, review of the x-ray findings, forming a differential Dx and discussing all treatment options. This Includes both surgical and non-surgical treatments. I also reviewed all the risks and benefits of non-operative & operative Tx options, future impact into orthopedic functions/problems, activity restrictions both at home and at work, and all follow up requirements.

## 2024-05-12 NOTE — REASON FOR VISIT
Patient was verbally notified, she will call back to schedule and will go to [facility] if symptoms continue or worsen, she declined appt at this time   [Follow-Up Visit] : a follow-up visit for [FreeTextEntry2] : Bilateral knee osteoarthritis

## 2024-05-12 NOTE — PROCEDURE
[Injection] : Injection [Right] : of the right [Knee Joint] : knee joint [Osteoarthritis] : Osteoarthritis [Patient] : patient [Risk] : risk [Benefits] : benefits [Alternatives] : alternatives [Bleeding] : bleeding [Infection] : infection [Allergic Reaction] : allergic reaction [Verbal Consent Obtained] : verbal consent was obtained prior to the procedure [Alcohol] : Alcohol [Ethyl Chloride Spray] : ethyl chloride spray was used as a topical anesthetic [Medial] : medial [Anterior] : anterior [25] : a 25-gauge [1% Lidocaine___(mL)] : [unfilled] mL of 1% Lidocaine [Methylpred. 40mg/mL___(mL)] : [unfilled] mL of 40mg/mL methylprednisolone [Bandage Applied] : a bandage [Tolerated Well] : The patient tolerated the procedure well [None] : none

## 2024-05-12 NOTE — HISTORY OF PRESENT ILLNESS
[de-identified] : Patient is a 78-year-old female who presents today for an evaluation regarding her right knee.  She states that she has been seen in the past regarding her knee.  And explained that she does have osteoarthritic changes.  Unfortunately 2 weeks ago she states that she accidentally tripped and fell onto her steps hitting her right knee.  She states that the pain is an 8 on a scale of 1-10 which is worse with bending.  She did feel some swelling following this but occurred the following day.  She did apply ice.  And although it has been many weeks the pain still persist.

## 2024-05-12 NOTE — PHYSICAL EXAM
[Antalgic] : antalgic [LE] : Sensory: Intact in bilateral lower extremities [ALL] : dorsalis pedis, posterior tibial, femoral, popliteal, and radial 2+ and symmetric bilaterally [de-identified] : On physical examination of the right knee.  The skin is clean dry and intact with no areas of redness swelling or heat noted.  There is some pain and tenderness diffusely but mostly in the patellofemoral compartment as well as the medial femoral-tibial compartment.  She does have adequate range of motion both passive and actively as she is able to fully extend the knee with flexion to approximately 120 degrees.  She is neurovascularly intact no evidence of ligamentous laxity.  No evidence of any muscle atrophy.  No evidence of any motor or sensory deficit.  Patient good distal pulses with no calf tenderness. [de-identified] : X-rays of the right knee were taken and 3 views.  AP lateral and sunrise.  And shows osteoarthritic changes with narrowing of the joint spacing.  No evidence of any fracture or dislocation.

## 2024-05-17 ENCOUNTER — APPOINTMENT (OUTPATIENT)
Dept: ORTHOPEDIC SURGERY | Facility: CLINIC | Age: 79
End: 2024-05-17

## 2024-06-17 ENCOUNTER — NON-APPOINTMENT (OUTPATIENT)
Age: 79
End: 2024-06-17

## 2024-06-17 ENCOUNTER — APPOINTMENT (OUTPATIENT)
Dept: OPHTHALMOLOGY | Facility: CLINIC | Age: 79
End: 2024-06-17
Payer: MEDICARE

## 2024-06-17 PROCEDURE — 92004 COMPRE OPH EXAM NEW PT 1/>: CPT

## 2024-06-17 PROCEDURE — 92134 CPTRZ OPH DX IMG PST SGM RTA: CPT

## 2024-09-25 ENCOUNTER — APPOINTMENT (OUTPATIENT)
Dept: RHEUMATOLOGY | Facility: CLINIC | Age: 79
End: 2024-09-25
Payer: MEDICARE

## 2024-09-25 VITALS
BODY MASS INDEX: 25.76 KG/M2 | WEIGHT: 140 LBS | OXYGEN SATURATION: 97 % | TEMPERATURE: 98 F | DIASTOLIC BLOOD PRESSURE: 80 MMHG | HEIGHT: 62 IN | SYSTOLIC BLOOD PRESSURE: 134 MMHG | RESPIRATION RATE: 17 BRPM | HEART RATE: 82 BPM

## 2024-09-25 DIAGNOSIS — Z82.69 FAMILY HISTORY OF OTHER DISEASES OF THE MUSCULOSKELETAL SYSTEM AND CONNECTIVE TISSUE: ICD-10-CM

## 2024-09-25 DIAGNOSIS — M15.9 POLYOSTEOARTHRITIS, UNSPECIFIED: ICD-10-CM

## 2024-09-25 DIAGNOSIS — M47.816 SPONDYLOSIS W/OUT MYELOPATHY OR RADICULOPATHY, LUMBAR REGION: ICD-10-CM

## 2024-09-25 DIAGNOSIS — M54.50 LOW BACK PAIN, UNSPECIFIED: ICD-10-CM

## 2024-09-25 DIAGNOSIS — M25.50 PAIN IN UNSPECIFIED JOINT: ICD-10-CM

## 2024-09-25 LAB
BASOPHILS # BLD AUTO: 0.04 K/UL
BASOPHILS NFR BLD AUTO: 0.7 %
CCP AB SER IA-ACNC: <8 U/ML
CK SERPL-CCNC: 77 U/L
CRP SERPL-MCNC: <3 MG/L
EOSINOPHIL # BLD AUTO: 0.14 K/UL
EOSINOPHIL NFR BLD AUTO: 2.5 %
ERYTHROCYTE [SEDIMENTATION RATE] IN BLOOD BY WESTERGREN METHOD: < 2 MM/HR
HCT VFR BLD CALC: 45.3 %
HGB BLD-MCNC: 14.5 G/DL
IMM GRANULOCYTES NFR BLD AUTO: 0.2 %
LYMPHOCYTES # BLD AUTO: 1.67 K/UL
LYMPHOCYTES NFR BLD AUTO: 29.9 %
MAN DIFF?: NORMAL
MCHC RBC-ENTMCNC: 30.6 PG
MCHC RBC-ENTMCNC: 32 GM/DL
MCV RBC AUTO: 95.6 FL
MONOCYTES # BLD AUTO: 0.4 K/UL
MONOCYTES NFR BLD AUTO: 7.2 %
NEUTROPHILS # BLD AUTO: 3.33 K/UL
NEUTROPHILS NFR BLD AUTO: 59.5 %
PLATELET # BLD AUTO: 302 K/UL
RBC # BLD: 4.74 M/UL
RBC # FLD: 13.4 %
RF+CCP IGG SER-IMP: NEGATIVE
RHEUMATOID FACT SER QL: <10 IU/ML
WBC # FLD AUTO: 5.59 K/UL

## 2024-09-25 PROCEDURE — 99205 OFFICE O/P NEW HI 60 MIN: CPT

## 2024-09-25 RX ORDER — ALPRAZOLAM 0.5 MG/1
0.5 TABLET ORAL
Refills: 0 | Status: ACTIVE | COMMUNITY

## 2024-09-25 NOTE — HISTORY OF PRESENT ILLNESS
[FreeTextEntry1] : 79 year old female with PMH as listed below presents today for an initial evaluation for joint pain  Presenting complaints: - Reports to have chronic hx of pain to BL hands, BL shoulders, L spine, BL knees. Pain comes and goes. Worse with activity and at the end of the day.  - Follows ortho for BL knee pain- dx with OA. Gets gel/ steroid injections with mild improvement  - Over the last 6 months reports pain to her knees now getting worse. Associated with pain to BL thighs and mild weakness. - Has difficulty getting up from seated position.  - 2 falls outside in last 1.5 yrs related to knee pain - Occasional muscle cramps at night  - PMHx: - High blood pressure - High cholesterol  - Medications: - Statin (long-term use) - Antihypertensive   - Family Hx: Mother with OA   Denies fever, chills, CP, SOB, abd pain, rashes, oral/nasal ulcers, rashes  Labs/chart reviewed

## 2024-09-25 NOTE — ASSESSMENT
[FreeTextEntry1] : Symptoms likely from OA/ degenerative disease  - labs and imaging as below. will call pt with results - conservative treatment of the patient's condition- including rest, ice, heat, anti-inflammatory medications, activity modifications, home stretching and strengthening exercises daily.  Discussed treatment plan with the patient. The patient was given the opportunity to ask questions and all questions were answered to their satisfaction.

## 2024-09-25 NOTE — PHYSICAL EXAM
[General Appearance - Alert] : alert [General Appearance - In No Acute Distress] : in no acute distress [General Appearance - Well Nourished] : well nourished [General Appearance - Well Developed] : well developed [Sclera] : the sclera and conjunctiva were normal [Outer Ear] : the ears and nose were normal in appearance [Neck Appearance] : the appearance of the neck was normal [Musculoskeletal - Swelling] : no joint swelling seen [FreeTextEntry1] : +BL knee crepitus [] : no rash [No Focal Deficits] : no focal deficits [Oriented To Time, Place, And Person] : oriented to person, place, and time

## 2024-09-26 LAB
ANA PAT FLD IF-IMP: ABNORMAL
ANA PATTERN: ABNORMAL
ANA SER IF-ACNC: ABNORMAL
ANA TITER: ABNORMAL

## 2024-09-28 LAB
ALBUMIN MFR SERPL ELPH: 66 %
ALBUMIN SERPL-MCNC: 4.7 G/DL
ALBUMIN/GLOB SERPL: 2 RATIO
ALPHA1 GLOB MFR SERPL ELPH: 4.1 %
ALPHA1 GLOB SERPL ELPH-MCNC: 0.3 G/DL
ALPHA2 GLOB MFR SERPL ELPH: 8.9 %
ALPHA2 GLOB SERPL ELPH-MCNC: 0.6 G/DL
B-GLOBULIN MFR SERPL ELPH: 10.4 %
B-GLOBULIN SERPL ELPH-MCNC: 0.7 G/DL
GAMMA GLOB FLD ELPH-MCNC: 0.8 G/DL
GAMMA GLOB MFR SERPL ELPH: 10.6 %
INTERPRETATION SERPL IEP-IMP: NORMAL
M PROTEIN SPEC IFE-MCNC: NORMAL
PROT SERPL-MCNC: 7.1 G/DL
PROT SERPL-MCNC: 7.1 G/DL

## 2024-10-09 ENCOUNTER — APPOINTMENT (OUTPATIENT)
Dept: RHEUMATOLOGY | Facility: CLINIC | Age: 79
End: 2024-10-09
Payer: MEDICARE

## 2024-10-09 VITALS
HEART RATE: 75 BPM | OXYGEN SATURATION: 96 % | TEMPERATURE: 98 F | HEIGHT: 62 IN | SYSTOLIC BLOOD PRESSURE: 124 MMHG | DIASTOLIC BLOOD PRESSURE: 70 MMHG

## 2024-10-09 DIAGNOSIS — M15.9 POLYOSTEOARTHRITIS, UNSPECIFIED: ICD-10-CM

## 2024-10-09 DIAGNOSIS — M54.16 RADICULOPATHY, LUMBAR REGION: ICD-10-CM

## 2024-10-09 DIAGNOSIS — M25.50 PAIN IN UNSPECIFIED JOINT: ICD-10-CM

## 2024-10-09 DIAGNOSIS — M47.816 SPONDYLOSIS W/OUT MYELOPATHY OR RADICULOPATHY, LUMBAR REGION: ICD-10-CM

## 2024-10-09 PROCEDURE — 99213 OFFICE O/P EST LOW 20 MIN: CPT

## 2024-10-16 ENCOUNTER — APPOINTMENT (OUTPATIENT)
Dept: RHEUMATOLOGY | Facility: CLINIC | Age: 79
End: 2024-10-16

## 2024-11-01 ENCOUNTER — APPOINTMENT (OUTPATIENT)
Dept: VASCULAR SURGERY | Facility: CLINIC | Age: 79
End: 2024-11-01

## 2024-11-08 ENCOUNTER — APPOINTMENT (OUTPATIENT)
Dept: ORTHOPEDIC SURGERY | Facility: CLINIC | Age: 79
End: 2024-11-08
Payer: MEDICARE

## 2024-11-08 VITALS — HEIGHT: 62 IN | BODY MASS INDEX: 25.76 KG/M2 | WEIGHT: 140 LBS

## 2024-11-08 DIAGNOSIS — M17.0 BILATERAL PRIMARY OSTEOARTHRITIS OF KNEE: ICD-10-CM

## 2024-11-08 PROCEDURE — 20610 DRAIN/INJ JOINT/BURSA W/O US: CPT | Mod: 50

## 2024-11-08 PROCEDURE — 99213 OFFICE O/P EST LOW 20 MIN: CPT | Mod: 25

## 2024-11-18 ENCOUNTER — OFFICE (OUTPATIENT)
Dept: URBAN - METROPOLITAN AREA CLINIC 104 | Facility: CLINIC | Age: 79
Setting detail: OPHTHALMOLOGY
End: 2024-11-18
Payer: MEDICARE

## 2024-11-18 DIAGNOSIS — Z96.1: ICD-10-CM

## 2024-11-18 DIAGNOSIS — H26.493: ICD-10-CM

## 2024-11-18 DIAGNOSIS — H02.834: ICD-10-CM

## 2024-11-18 DIAGNOSIS — H43.393: ICD-10-CM

## 2024-11-18 DIAGNOSIS — H16.223: ICD-10-CM

## 2024-11-18 DIAGNOSIS — H02.831: ICD-10-CM

## 2024-11-18 DIAGNOSIS — H35.372: ICD-10-CM

## 2024-11-18 PROCEDURE — 92014 COMPRE OPH EXAM EST PT 1/>: CPT | Performed by: OPHTHALMOLOGY

## 2024-11-18 ASSESSMENT — REFRACTION_MANIFEST
OD_AXIS: 55
OD_CYLINDER: -0.50
OD_ADD: +2.75
OD_VA1: 20/20
OD_VA2: 20/20(J1+)
OS_CYLINDER: -0.50
OD_SPHERE: PLANO
OS_VA2: 20/20(J1+)
OS_AXIS: 090
OS_ADD: +2.75
OS_VA1: 20/20
OS_SPHERE: PLANO

## 2024-11-18 ASSESSMENT — TONOMETRY
OS_IOP_MMHG: 12
OD_IOP_MMHG: 12

## 2024-11-18 ASSESSMENT — SUPERFICIAL PUNCTATE KERATITIS (SPK)
OD_SPK: T
OS_SPK: T

## 2024-11-18 ASSESSMENT — CORNEAL EDEMA - FOLDS/STRIAE
OD_FOLDSSTRIAE: ABSENT
OS_FOLDSSTRIAE: ABSENT

## 2024-11-18 ASSESSMENT — VISUAL ACUITY
OD_BCVA: 20/25
OS_BCVA: 20/25

## 2024-11-18 ASSESSMENT — CORNEAL EDEMA - MICROCYSTIC EPITHELIAL EDEMA (MCE): OS_MCE: ABSENT

## 2024-11-18 ASSESSMENT — LID POSITION - DERMATOCHALASIS
OS_DERMATOCHALASIS: LUL 1+
OD_DERMATOCHALASIS: RUL 1+

## 2024-11-18 ASSESSMENT — CONFRONTATIONAL VISUAL FIELD TEST (CVF)
OS_FINDINGS: FULL
OD_FINDINGS: FULL

## 2024-12-16 ENCOUNTER — APPOINTMENT (OUTPATIENT)
Dept: OPHTHALMOLOGY | Facility: CLINIC | Age: 79
End: 2024-12-16

## 2025-06-28 ENCOUNTER — NON-APPOINTMENT (OUTPATIENT)
Age: 80
End: 2025-06-28

## 2025-06-30 ENCOUNTER — APPOINTMENT (OUTPATIENT)
Dept: GASTROENTEROLOGY | Facility: CLINIC | Age: 80
End: 2025-06-30

## 2025-06-30 VITALS
SYSTOLIC BLOOD PRESSURE: 150 MMHG | HEIGHT: 62 IN | HEART RATE: 68 BPM | DIASTOLIC BLOOD PRESSURE: 70 MMHG | BODY MASS INDEX: 25.76 KG/M2 | WEIGHT: 140 LBS | RESPIRATION RATE: 16 BRPM | OXYGEN SATURATION: 97 %

## 2025-06-30 PROBLEM — E73.9 LACTOSE INTOLERANCE IN ADULT: Status: ACTIVE | Noted: 2025-06-30

## 2025-06-30 PROCEDURE — 99214 OFFICE O/P EST MOD 30 MIN: CPT

## 2025-08-11 ENCOUNTER — APPOINTMENT (OUTPATIENT)
Dept: VASCULAR SURGERY | Facility: CLINIC | Age: 80
End: 2025-08-11
Payer: MEDICARE

## 2025-08-11 VITALS
TEMPERATURE: 98.4 F | HEIGHT: 62 IN | HEART RATE: 78 BPM | WEIGHT: 143 LBS | OXYGEN SATURATION: 97 % | RESPIRATION RATE: 14 BRPM | DIASTOLIC BLOOD PRESSURE: 73 MMHG | SYSTOLIC BLOOD PRESSURE: 143 MMHG | BODY MASS INDEX: 26.31 KG/M2

## 2025-08-11 DIAGNOSIS — I83.819 VARICOSE VEINS OF UNSPECIFIED LOWER EXTREMITY WITH PAIN: ICD-10-CM

## 2025-08-11 PROCEDURE — 99204 OFFICE O/P NEW MOD 45 MIN: CPT

## 2025-08-11 PROCEDURE — 93970 EXTREMITY STUDY: CPT

## 2025-08-17 ENCOUNTER — NON-APPOINTMENT (OUTPATIENT)
Age: 80
End: 2025-08-17

## 2025-08-26 ENCOUNTER — APPOINTMENT (OUTPATIENT)
Age: 80
End: 2025-08-26
Payer: MEDICARE

## 2025-08-26 DIAGNOSIS — M20.40 OTHER HAMMER TOE(S) (ACQUIRED), UNSPECIFIED FOOT: ICD-10-CM

## 2025-08-26 DIAGNOSIS — M19.072 PRIMARY OSTEOARTHRITIS, LEFT ANKLE AND FOOT: ICD-10-CM

## 2025-08-26 PROCEDURE — 99204 OFFICE O/P NEW MOD 45 MIN: CPT

## 2025-08-26 PROCEDURE — 73630 X-RAY EXAM OF FOOT: CPT | Mod: LT

## 2025-09-04 ENCOUNTER — APPOINTMENT (OUTPATIENT)
Dept: VASCULAR SURGERY | Facility: CLINIC | Age: 80
End: 2025-09-04
Payer: MEDICARE

## 2025-09-04 VITALS
DIASTOLIC BLOOD PRESSURE: 76 MMHG | SYSTOLIC BLOOD PRESSURE: 143 MMHG | HEART RATE: 87 BPM | RESPIRATION RATE: 16 BRPM | OXYGEN SATURATION: 95 % | TEMPERATURE: 98.4 F

## 2025-09-04 PROCEDURE — 36475 ENDOVENOUS RF 1ST VEIN: CPT | Mod: RT

## 2025-09-08 ENCOUNTER — APPOINTMENT (OUTPATIENT)
Dept: VASCULAR SURGERY | Facility: CLINIC | Age: 80
End: 2025-09-08
Payer: MEDICARE

## 2025-09-08 VITALS
WEIGHT: 142 LBS | HEART RATE: 75 BPM | OXYGEN SATURATION: 95 % | RESPIRATION RATE: 16 BRPM | BODY MASS INDEX: 26.13 KG/M2 | DIASTOLIC BLOOD PRESSURE: 65 MMHG | SYSTOLIC BLOOD PRESSURE: 112 MMHG | HEIGHT: 62 IN | TEMPERATURE: 98.4 F

## 2025-09-08 DIAGNOSIS — I83.819 VARICOSE VEINS OF UNSPECIFIED LOWER EXTREMITY WITH PAIN: ICD-10-CM

## 2025-09-08 PROCEDURE — 93971 EXTREMITY STUDY: CPT | Mod: RT

## 2025-09-08 PROCEDURE — 99214 OFFICE O/P EST MOD 30 MIN: CPT

## 2025-09-18 ENCOUNTER — APPOINTMENT (OUTPATIENT)
Dept: VASCULAR SURGERY | Facility: CLINIC | Age: 80
End: 2025-09-18

## 2025-09-18 VITALS
OXYGEN SATURATION: 96 % | DIASTOLIC BLOOD PRESSURE: 75 MMHG | SYSTOLIC BLOOD PRESSURE: 123 MMHG | HEART RATE: 71 BPM | RESPIRATION RATE: 14 BRPM | BODY MASS INDEX: 26.13 KG/M2 | TEMPERATURE: 98.6 F | WEIGHT: 142 LBS | HEIGHT: 62 IN

## 2025-09-18 DIAGNOSIS — I83.819 VARICOSE VEINS OF UNSPECIFIED LOWER EXTREMITY WITH PAIN: ICD-10-CM

## (undated) DEVICE — TROCAR ETHICON XCEL UNIVERSAL SLEEVE W OPTIVIEW 5MM-100MM

## (undated) DEVICE — BLADE SURGICAL #11 CARBON

## (undated) DEVICE — DRSG 2X2

## (undated) DEVICE — ELCTR CORD FOOTSWITCH 1PLR LAPSCP 10FT

## (undated) DEVICE — TROCAR COVIDIEN VERSAONE OPTICAL BLADELESS 5MM

## (undated) DEVICE — WRAP COMPRESSION CALF MED

## (undated) DEVICE — LIGASURE MARYLAND JAW LAPAROSCOPIC SEALER 37CM

## (undated) DEVICE — ENDOCATCH 10MM SPECIMEN POUCH

## (undated) DEVICE — KIT DEFENDO 4 OLY 4 PC

## (undated) DEVICE — VALVE ENDO SURESEAL II 0-5FR

## (undated) DEVICE — Device

## (undated) DEVICE — SUT VICRYL 0 27" UR-6

## (undated) DEVICE — DISSECTOR ENDOSCOPIC KITTNER SINGLE TIP

## (undated) DEVICE — BLADE SURGICAL #15 CARBON

## (undated) DEVICE — D HELP - CLEARVIEW CLEARIFY SYSTEM

## (undated) DEVICE — DRSG TEGADERM 2.5X3"

## (undated) DEVICE — FORCEP ENDOJAW AGTR LC W NDL 2.8MM 230CM DISP

## (undated) DEVICE — DRAPE HALF SHEET 40X57"

## (undated) DEVICE — SUT VICRYL PLUS 4-0 18" PS-2 UNDYED

## (undated) DEVICE — PACK GENERAL LAPAROSCOPY

## (undated) DEVICE — TROCAR COVIDIEN VERSAONE BLUNT TIP HASSAN 12MM

## (undated) DEVICE — BLANKET WARMER UPPER ADULT

## (undated) DEVICE — SYR CONTROL LUER LOK 10CC

## (undated) DEVICE — GLV 8 ESTEEM BLUE

## (undated) DEVICE — BLANKET WARMER LOWER ADULT

## (undated) DEVICE — GLV 8 PROTEXIS

## (undated) DEVICE — POLY TRAP ETRAP

## (undated) DEVICE — DRSG MASTISOL

## (undated) DEVICE — SNARE MINI-MICRO OVAL

## (undated) DEVICE — DRSG STERISTRIPS 0.5X4"